# Patient Record
Sex: FEMALE | Race: WHITE | NOT HISPANIC OR LATINO | Employment: OTHER | URBAN - METROPOLITAN AREA
[De-identification: names, ages, dates, MRNs, and addresses within clinical notes are randomized per-mention and may not be internally consistent; named-entity substitution may affect disease eponyms.]

---

## 2023-12-19 ENCOUNTER — TRANSFERRED RECORDS (OUTPATIENT)
Dept: HEALTH INFORMATION MANAGEMENT | Facility: CLINIC | Age: 68
End: 2023-12-19

## 2023-12-28 ENCOUNTER — TRANSCRIBE ORDERS (OUTPATIENT)
Dept: OTHER | Age: 68
End: 2023-12-28

## 2023-12-28 DIAGNOSIS — E21.3 HYPERPARATHYROIDISM (H): Primary | ICD-10-CM

## 2023-12-29 ENCOUNTER — TELEPHONE (OUTPATIENT)
Dept: ENDOCRINOLOGY | Facility: CLINIC | Age: 68
End: 2023-12-29
Payer: MEDICARE

## 2023-12-29 NOTE — TELEPHONE ENCOUNTER
[facility] Call [facility]    Phone Message    May a detailed message be left on voicemail: yes     Reason for Call: Appointment Intake    Referring Provider Name: LUKE MORRIS  Diagnosis and/or Symptoms: Hyperparathyroidism- scheduled first avail 8/12/24- this dx to schedule within 2 weeks, please review, thank you    Action Taken: Message routed to:  [facility] ([facility] endocrinology    Travel Screening: Not Applicable

## 2023-12-29 NOTE — TELEPHONE ENCOUNTER
Endocrine triage  The scheduled first available endocrine appointment timeframe is acceptable until/unless someone can provide some relevant data.  Chart reviews no relevant data. .  E-consult may be an option for answer to specific question by the referring provider.  E-consults generally have a response within 3 days.  Beverly Kong MD

## 2023-12-29 NOTE — TELEPHONE ENCOUNTER
No results available to triage for a sooner visit. However note in referral states:   Note:    Additional Information:  Dr. Santana - secondary hyperparathyroidism/browns tumor for possible parathyroidectomy    Pended for triage.   Stephanie Bell RN on 12/29/2023 at 10:23 AM

## 2024-01-03 NOTE — TELEPHONE ENCOUNTER
Called RN at [facility] in [place], [place]- spoke with Salud.    She gave me some additional facts about the pt.  In the past, she was told parathyroid removal likely was needed, but pt didn't want to do it, wanted to try medication first for Giant cell leision / Hyperparathyroidism    She has a long standing PTH elevation.  Recently had a fall and is currently on dialysis.     Pt now in agreement that she needs a parathyroidectomy.    Medication was being used.  Cannot use due to lab changes currently.    Offered pt appt here in [place] next Thursday 1/11 at 10:10am, but pt unable to make appt as she is at dialysis Tuesday/Thursday.  She states Mondays work the best for her.    Routing message to Dr. Santana's team in [place].    Pati Reid RN

## 2024-01-04 NOTE — TELEPHONE ENCOUNTER
FUTURE VISIT INFORMATION      FUTURE VISIT INFORMATION:  Date: 3/4/24  Time: 4pm  Location: [facility]  REFERRAL INFORMATION:  Referring provider:  Dr.Kyle Simpson  Referring providers clinic:  [facility] [place] [facility]   Reason for visit/diagnosis  Hyperparathyroidism - [facility] [place] [facility]     RECORDS REQUESTED FROM:       Clinic name Comments Records Status Imaging Status   [facility]  [facility]- Procedure Dr.Kyle Simpson CE    [facility] 12/29/23- mychart referral Epic     [facility]  10/25/23- PET  CE 2/27/24- pending  PACS      February 27, 2024 10:46 AM Called [facility] to push images to [facility] PACS- Cindi   February 27, 2024 11:12 AM - Received image in pacs and attached it to patient- Cindi

## 2024-01-05 NOTE — TELEPHONE ENCOUNTER
Reviewed chart.  Pt is scheduled to see Dr. Santana at the  on 3/4/24.    Closing encounter.  Pati Reid RN

## 2024-03-04 ENCOUNTER — OFFICE VISIT (OUTPATIENT)
Dept: OTOLARYNGOLOGY | Facility: CLINIC | Age: 69
End: 2024-03-04
Payer: MEDICARE

## 2024-03-04 ENCOUNTER — PREP FOR PROCEDURE (OUTPATIENT)
Dept: OTOLARYNGOLOGY | Facility: CLINIC | Age: 69
End: 2024-03-04

## 2024-03-04 ENCOUNTER — PRE VISIT (OUTPATIENT)
Dept: OTOLARYNGOLOGY | Facility: CLINIC | Age: 69
End: 2024-03-04

## 2024-03-04 VITALS
SYSTOLIC BLOOD PRESSURE: 167 MMHG | DIASTOLIC BLOOD PRESSURE: 80 MMHG | HEIGHT: 64 IN | BODY MASS INDEX: 24.07 KG/M2 | TEMPERATURE: 97.2 F | HEART RATE: 66 BPM | WEIGHT: 141 LBS

## 2024-03-04 DIAGNOSIS — N25.81 SECONDARY RENAL HYPERPARATHYROIDISM (H): Primary | ICD-10-CM

## 2024-03-04 DIAGNOSIS — E21.0: ICD-10-CM

## 2024-03-04 PROCEDURE — 99204 OFFICE O/P NEW MOD 45 MIN: CPT | Performed by: SURGERY

## 2024-03-04 RX ORDER — SODIUM ZIRCONIUM CYCLOSILICATE 10 G/10G
POWDER, FOR SUSPENSION ORAL
Status: ON HOLD | COMMUNITY
Start: 2023-08-28 | End: 2024-06-05

## 2024-03-04 RX ORDER — HYDROXYZINE HYDROCHLORIDE 10 MG/1
10-20 TABLET, FILM COATED ORAL EVERY 8 HOURS PRN
COMMUNITY
Start: 2023-11-14

## 2024-03-04 RX ORDER — FLUTICASONE PROPIONATE 50 MCG
2 SPRAY, SUSPENSION (ML) NASAL PRN
COMMUNITY
Start: 2023-02-13

## 2024-03-04 RX ORDER — SEVELAMER CARBONATE 800 MG/1
TABLET, FILM COATED ORAL
COMMUNITY

## 2024-03-04 RX ORDER — DEXAMETHASONE SODIUM PHOSPHATE 4 MG/ML
10 INJECTION, SOLUTION INTRA-ARTICULAR; INTRALESIONAL; INTRAMUSCULAR; INTRAVENOUS; SOFT TISSUE ONCE
Status: CANCELLED | OUTPATIENT
Start: 2024-03-04 | End: 2024-03-04

## 2024-03-04 RX ORDER — CARVEDILOL 12.5 MG/1
12.5 TABLET ORAL 2 TIMES DAILY WITH MEALS
COMMUNITY
Start: 2024-01-22

## 2024-03-04 RX ORDER — AMLODIPINE BESYLATE 10 MG/1
10 TABLET ORAL DAILY
COMMUNITY
Start: 2024-02-07

## 2024-03-04 RX ORDER — NITROGLYCERIN 0.4 MG/1
TABLET SUBLINGUAL
COMMUNITY

## 2024-03-04 RX ORDER — ONDANSETRON 4 MG/1
TABLET, FILM COATED ORAL
COMMUNITY
Start: 2024-01-02

## 2024-03-04 RX ORDER — ASPIRIN 81 MG/1
81 TABLET ORAL DAILY
COMMUNITY

## 2024-03-04 RX ORDER — ENALAPRIL MALEATE 10 MG/1
TABLET ORAL
COMMUNITY
Start: 2023-05-11

## 2024-03-04 RX ORDER — DULOXETIN HYDROCHLORIDE 20 MG/1
20 CAPSULE, DELAYED RELEASE ORAL EVERY EVENING
COMMUNITY
Start: 2024-02-08

## 2024-03-04 ASSESSMENT — PAIN SCALES - GENERAL: PAINLEVEL: MODERATE PAIN (5)

## 2024-03-04 NOTE — LETTER
3/4/2024       RE: Radha Winchester  [address]   [place] [address]     Dear Colleague,    Thank you for referring your patient, Radha Winchester, to the [facility] [place] [facility]. Please see a copy of my visit note below.    Teaching Flowsheet - ENT   Relevant Diagnosis: hyperparathyroidism   Teaching Topic:Person(s) involved in teaching: patient and son        Motivation Level:  Asks Questions:   Yes  Eager to Learn:   Yes  Cooperative:   Yes  Receptive (willing/able to accept information):   Yes  Comments: Reviewed pre-op H and P,  NPO prior to  surgery,  pre-op scrub (given Hibiclens)  Reviewed post-op  cares , activity and pain.     Patient demonstrates understanding of the following:  Reason for the appointment, diagnosis and treatment plan:   Yes  Knowledge of proper use of medications and conditions for which they are ordered (with special attention to potential side effects or drug interactions):  stop aspirin products 1 week before surgery Yes  Which situations necessitate calling provider and whom to contact:   Yes  Nutritional needs and diet plan:   Yes  Pain management techniques:   Yes  Patient instructed on hand hygiene:  Yes  How and/when to access community resources:   Yes     Infection Prevention:  Patient   demonstrates understanding of the following:  Surgical procedure site care taught Yes  Signs and symptoms of infection taught Yes  Wound care taught Yes  Instructional Materials Used/Given: verbal instruction.     SURGERY CLINIC CONSULTATION    REASON FOR CONSULTATION:  Radha Winchester was referred by Dr. Simpson in nephrology for evaluation and discussion of treatment options for secondary hyperparathyroidism with brown bone tumors     HISTORY OF PRESENT ILLNESS:  Radha Winchester is a 68 year old female who with a pertinent history of ovarian cancer treated with a TRACY/BSO many many years ago.  She  "also has renal failure on hemodialysis for several years.  She fell recently and an x-ray identified several lytic lesions.  With her ovarian cancer history as well as her renal failure a PET scan was obtained that identified multiple lytic lesions throughout her skeleton.  She underwent a biopsy of the left iliac bone that was consistent with a brown bone tumor.  Her  was within normal limits.  Her most recent labs include a calcium of 9.4, and alk phos of 593.  I do not see a parathyroid hormone level.  And I confirmed with her that she is not on a calcimimetic.  I do see in her chart if there is a recent or relatively recent history of a non-STEMI.    Regarding these lytic lesions and her hyperparathyroidism, I confirmed that she is not and has not been on a calcium by medic recently.  She has had fractures and has bone pain throughout her extremities.  In fact she finds it difficult to walk.  She has no previous neck surgeries.  No previous head neck radiation.    The patient has a significant medical and surgical history which I reviewed.  It is available in the [employer] system and through Care Everywhere in [facility]      REVIEW OF SYSTEMS:  ROS EXAM: 10 point review of systems is pertinent for that noted in the HPI  There is no problem list on file for this patient.      No past surgical history on file.    Allergies   Allergen Reactions     Lidocaine Dermatitis and Rash     Cream only; Applied topically to fistula; \"it was eating at my arm\"     Clopidogrel Other (See Comments)     NON-responder     Levofloxacin Diarrhea and Unknown     Metformin Diarrhea     Lorazepam Other (See Comments)     Povidone Iodine Itching and Rash       Medications reviewed in EMR        No family history on file.       PHYSICAL EXAM:  BP (!) 167/80   Pulse 66   Temp 97.2  F (36.2  C)   Ht 1.626 m (5' 4\")   Wt 64 kg (141 lb)   BMI 24.20 kg/m      Neck: Her thyroid gland is very small.  She does have prominent veins in the " anterior jugular index internal jugular region.  There is no cervical lymphadenopathy.  Trachea is midline.    I personally reviewed her outside radiographic images and laboratory data    ASSESSMENT:   1. Secondary renal hyperparathyroidism (H24)    2. 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  So a PACS order was placed  Following this I like to get an ultrasound of her neck to help us localize these parathyroid adenomas  I then recommend the patient undergo a 3-1/2 gland parathyroidectomy.  The surgical procedure was discussed with the patient including likely severe bone hunger and the need for a hospitalization, we reviewed the risks of bleeding infection injury to the recurrent laryngeal nerve or nerves potential permanent hypocalcemia.  At the conclusion of our visit the patient felt that all of her questions and concerns were addressed regarding her hyperparathyroidism    Anita Santana MD

## 2024-03-04 NOTE — PROGRESS NOTES
Teaching Flowsheet - ENT   Relevant Diagnosis: hyperparathyroidism   Teaching Topic:Person(s) involved in teaching: patient and son        Motivation Level:  Asks Questions:   Yes  Eager to Learn:   Yes  Cooperative:   Yes  Receptive (willing/able to accept information):   Yes  Comments: Reviewed pre-op H and P,  NPO prior to  surgery,  pre-op scrub (given Hibiclens)  Reviewed post-op  cares , activity and pain.     Patient demonstrates understanding of the following:  Reason for the appointment, diagnosis and treatment plan:   Yes  Knowledge of proper use of medications and conditions for which they are ordered (with special attention to potential side effects or drug interactions):  stop aspirin products 1 week before surgery Yes  Which situations necessitate calling provider and whom to contact:   Yes  Nutritional needs and diet plan:   Yes  Pain management techniques:   Yes  Patient instructed on hand hygiene:  Yes  How and/when to access community resources:   Yes     Infection Prevention:  Patient   demonstrates understanding of the following:  Surgical procedure site care taught Yes  Signs and symptoms of infection taught Yes  Wound care taught Yes  Instructional Materials Used/Given: verbal instruction.

## 2024-03-05 ENCOUNTER — TELEPHONE (OUTPATIENT)
Dept: SURGERY | Facility: CLINIC | Age: 69
End: 2024-03-05
Payer: MEDICARE

## 2024-03-05 NOTE — TELEPHONE ENCOUNTER
Called patient to schedule 3 AND 1/2 GLAND PARATHYROIDECTOMY (N/A) with Dr. Santana. Patient was at another appointment at the time of the call and therefore asked that writer followed back up today around 11:30.    Caroline Hanley on 3/5/2024 at 9:48 AM\

## 2024-03-05 NOTE — TELEPHONE ENCOUNTER
Patient is scheduled for surgery with Dr. Santana.     Spoke with: Patient     Date of Surgery: 5/01/24, offered patient 3/20/24    Location: [facility] OR     Pre op with Provider: ELLA     H&P: Patient will schedule pre op with Brigitte Browne. Informed patient pre op will need to be done within 30 days of surgery date.     Additional imaging/appointments: Per patients preference, scheduled for a virtual visit with Dr. Santana on 5/13/24 at 1:20 PM.   Patient needs an US prior to surgery, patient has been made aware of this. Patient asked I confirm if ok for imaging to be scheduled at clinic closer to home in [place]. Please advise.     Surgery packet: Will mail packet, confirmed with patient address in chart works best. Patient made aware arrival time for surgery will not be listed within packet.      Additional comments: Patient made aware a pre op nurse will call 2-5 days prior to surgery to go over further details/give arrival time.         Caroline Hanley on 3/5/2024 at 11:36 AM

## 2024-03-06 NOTE — TELEPHONE ENCOUNTER
Called patient back and informed them that it is ok for her to schedule US prior to surgery at any [place] location. Patient understood and thanked writer.       Caroline Hanley on 3/6/2024 at 8:12 AM

## 2024-03-10 ENCOUNTER — HEALTH MAINTENANCE LETTER (OUTPATIENT)
Age: 69
End: 2024-03-10

## 2024-03-11 ENCOUNTER — HOSPITAL ENCOUNTER (OUTPATIENT)
Dept: ULTRASOUND IMAGING | Facility: HOSPITAL | Age: 69
Discharge: HOME | End: 2024-03-11
Attending: SURGERY | Admitting: SURGERY
Payer: MEDICARE

## 2024-03-11 DIAGNOSIS — N25.81 SECONDARY RENAL HYPERPARATHYROIDISM (H): ICD-10-CM

## 2024-03-11 DIAGNOSIS — E21.0: ICD-10-CM

## 2024-03-11 PROCEDURE — 76536 US EXAM OF HEAD AND NECK: CPT

## 2024-03-22 NOTE — TELEPHONE ENCOUNTER
Rescheduled surgery with Dr. Santana from 5/01/2024 to 5/08/2024    Spoke with: Patient     Reason for reschedule: Dr. Santana unavailable on 5/01/24 for surgery.     Surgery is located at [facility], [place] [address]    Patient is aware their H&P will need to be within 30 days of their new surgery date     Is there imaging that needs to be rescheduled for new surgery date? No    Patients 2 week virtual post op has been rescheduled to 5/20/24 at 1:40PM.     Additional comments: Will send surgery packet through [email or web address] to reflect new surgery date.      Patient will call back if they have any questions or concerns.    Caroline Hanley on 3/22/2024 at 2:44 PM

## 2024-03-28 NOTE — TELEPHONE ENCOUNTER
Patient left a  asking for a call back regarding rescheduling surgery with Dr. Santana.     Rescheduled surgery with Dr. Santana from 5/08/2024 to 6/05/2024. Offered patient 5/15/24 however she said that date did not work and preferred to be scheduled into June.     Spoke with: Patient     Reason for reschedule: Dr. Santana no longer available on 5/08/2024.     [place] [address]    Patient is aware their H&P will need to be within 30 days of their new surgery date     Is there imaging that needs to be rescheduled for new surgery date? No    Patients 2  week post op has been rescheduled to 6/13/2024 at 11:30 AM.     Additional comments: Will send another surgery packet through [employer] to reflect new surgery date.     Patient will call back if they have any questions or concerns.    Caroline Hanley on 3/28/2024 at 1:16 PM

## 2024-03-28 NOTE — TELEPHONE ENCOUNTER
Called patient to reschedule surgery with Dr. Santana on 5/08/24 as Dr. Santana is no longer available. Writer offered patient reschedule date of 5/15/24. Patient said she would like to confirm a few things with her daughter and will give our office a call back. Patient provided with callback number [phone]

## 2024-04-13 NOTE — PROGRESS NOTES
"SURGERY CLINIC CONSULTATION    REASON FOR CONSULTATION:  Radha Winchester was referred by Dr. Simpson in nephrology for evaluation and discussion of treatment options for secondary hyperparathyroidism with brown bone tumors     HISTORY OF PRESENT ILLNESS:  Radha Winchester is a 68 year old female who with a pertinent history of ovarian cancer treated with a TRACY/BSO many many years ago.  She also has renal failure on hemodialysis for several years.  She fell recently and an x-ray identified several lytic lesions.  With her ovarian cancer history as well as her renal failure a PET scan was obtained that identified multiple lytic lesions throughout her skeleton.  She underwent a biopsy of the left iliac bone that was consistent with a brown bone tumor.  Her  was within normal limits.  Her most recent labs include a calcium of 9.4, and alk phos of 593.  I do not see a parathyroid hormone level.  And I confirmed with her that she is not on a calcimimetic.  I do see in her chart if there is a recent or relatively recent history of a non-STEMI.    Regarding these lytic lesions and her hyperparathyroidism, I confirmed that she is not and has not been on a calcium by medic recently.  She has had fractures and has bone pain throughout her extremities.  In fact she finds it difficult to walk.  She has no previous neck surgeries.  No previous head neck radiation.    The patient has a significant medical and surgical history which I reviewed.  It is available in the [employer] system and through Care Everywhere in [facility]      REVIEW OF SYSTEMS:  ROS EXAM: 10 point review of systems is pertinent for that noted in the HPI  There is no problem list on file for this patient.      No past surgical history on file.    Allergies   Allergen Reactions    Lidocaine Dermatitis and Rash     Cream only; Applied topically to fistula; \"it was eating at my arm\"    Clopidogrel Other (See Comments)     NON-responder    Levofloxacin Diarrhea and Unknown " "   Metformin Diarrhea    Lorazepam Other (See Comments)    Povidone Iodine Itching and Rash       Medications reviewed in EMR        No family history on file.       PHYSICAL EXAM:  BP (!) 167/80   Pulse 66   Temp 97.2  F (36.2  C)   Ht 1.626 m (5' 4\")   Wt 64 kg (141 lb)   BMI 24.20 kg/m      Neck: Her thyroid gland is very small.  She does have prominent veins in the anterior jugular index internal jugular region.  There is no cervical lymphadenopathy.  Trachea is midline.    I personally reviewed her outside radiographic images and laboratory data    ASSESSMENT:   1. Secondary renal hyperparathyroidism (H24)    2. 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  So a PACS order was placed  Following this I like to get an ultrasound of her neck to help us localize these parathyroid adenomas  I then recommend the patient undergo a 3-1/2 gland parathyroidectomy.  The surgical procedure was discussed with the patient including likely severe bone hunger and the need for a hospitalization, we reviewed the risks of bleeding infection injury to the recurrent laryngeal nerve or nerves potential permanent hypocalcemia.  At the conclusion of our visit the patient felt that all of her questions and concerns were addressed regarding her hyperparathyroidism    Anita Santana MD                "

## 2024-04-24 NOTE — TELEPHONE ENCOUNTER
Called patient to reschedule surgery on 6/05 with Dr. Santana. Patient offered reschedule date of 5/21/24. Patient said she would like to speak with her daughter to confirm that date works and will give our office a call back. Patient provided with callback number [phone].     Caroline Hanley on 4/24/2024 at 10:05 AM

## 2024-04-24 NOTE — TELEPHONE ENCOUNTER
Rescheduled surgery with Dr. Santana from 6/05/24 to 6/04/24.    Spoke with: Patient     Reason for reschedule: Dr. Santana no longer available on 6/05/24.    Surgery is located at [facility], [place] [address]    Patient is aware their H&P will need to be within 30 days of their new surgery date     Is there imaging that needs to be rescheduled for new surgery date? No: US completed 3/21/24.      Additional comments: Will send another surgery packet through [employer] to reflect new surgery date.      Patient will call back if they have any questions or concerns.    Caroline Hanley on 4/24/2024 at 10:26 AM

## 2024-05-28 ENCOUNTER — TELEPHONE (OUTPATIENT)
Dept: SURGERY | Facility: CLINIC | Age: 69
End: 2024-05-28
Payer: MEDICARE

## 2024-05-28 NOTE — TELEPHONE ENCOUNTER
Called pt to let her know Dr. Santana wants her to send her dialysis records to nephrology.  Pt said she is supposed to have dialysis after surgery.  She wants to ensure this will be set up after surgery since she is supposed to stay in the hospital overnight.    Pt would like to know when she should check in for surgery.  I let her know preop nurses will call her 1-3 days prior to surgery with an arrival time.      Routing message to [facility] team.    Pati Reid RN

## 2024-05-28 NOTE — TELEPHONE ENCOUNTER
[facility] Call Center    Phone Message    May a detailed message be left on voicemail: yes     Reason for Call: Other: Per pt she is wondering where she should have her dialyses information sent to. Please call to discuss. Thank you     Action Taken: Message routed to:  [facility] ENT    Travel Screening: Not Applicable

## 2024-05-28 NOTE — TELEPHONE ENCOUNTER
Called patient to review questions. Patient discussed that she is on a Tuesday, Thursday, Saturday scheduled for dialysis. Reviewed that patient will receive dialysis after surgery. Patient verbalized understanding and will follow up with surgery as scheduled.    Daisy MCLEODN, RN

## 2024-06-03 ENCOUNTER — ANESTHESIA EVENT (OUTPATIENT)
Dept: SURGERY | Facility: CLINIC | Age: 69
DRG: 674 | End: 2024-06-03
Payer: MEDICARE

## 2024-06-04 ENCOUNTER — ANESTHESIA (OUTPATIENT)
Dept: SURGERY | Facility: CLINIC | Age: 69
DRG: 674 | End: 2024-06-04
Payer: MEDICARE

## 2024-06-04 ENCOUNTER — HOSPITAL ENCOUNTER (INPATIENT)
Facility: CLINIC | Age: 69
LOS: 5 days | Discharge: HOME | DRG: 674 | End: 2024-06-09
Attending: SURGERY | Admitting: SURGERY
Payer: MEDICARE

## 2024-06-04 DIAGNOSIS — E21.3 HYPERPARATHYROIDISM (H): ICD-10-CM

## 2024-06-04 DIAGNOSIS — E83.81 HUNGRY BONE SYNDROME: Primary | ICD-10-CM

## 2024-06-04 PROBLEM — Z90.89 S/P SUBTOTAL PARATHYROIDECTOMY: Status: ACTIVE | Noted: 2024-06-04

## 2024-06-04 PROBLEM — Z98.890 S/P SUBTOTAL PARATHYROIDECTOMY: Status: ACTIVE | Noted: 2024-06-04

## 2024-06-04 LAB
ALBUMIN SERPL BCG-MCNC: 4.2 G/DL (ref 3.5–5.2)
CALCIUM SERPL-MCNC: 7.3 MG/DL (ref 8.8–10.2)
GLUCOSE BLDC GLUCOMTR-MCNC: 110 MG/DL (ref 70–99)
GLUCOSE BLDC GLUCOMTR-MCNC: 119 MG/DL (ref 70–99)
MAGNESIUM SERPL-MCNC: 2.4 MG/DL (ref 1.7–2.3)
PHOSPHATE SERPL-MCNC: 6.6 MG/DL (ref 2.5–4.5)
POTASSIUM SERPL-SCNC: 5.4 MMOL/L (ref 3.4–5.3)
POTASSIUM SERPL-SCNC: 6.2 MMOL/L (ref 3.4–5.3)
PTH-INTACT SERPL-MCNC: 145 PG/ML (ref 15–65)
PTH-INTACT SERPL-MCNC: 1990 PG/ML (ref 15–65)

## 2024-06-04 PROCEDURE — 60500 EXPLORE PARATHYROID GLANDS: CPT | Mod: GC | Performed by: STUDENT IN AN ORGANIZED HEALTH CARE EDUCATION/TRAINING PROGRAM

## 2024-06-04 PROCEDURE — 710N000010 HC RECOVERY PHASE 1, LEVEL 2, PER MIN: Performed by: SURGERY

## 2024-06-04 PROCEDURE — 83970 ASSAY OF PARATHORMONE: CPT | Performed by: STUDENT IN AN ORGANIZED HEALTH CARE EDUCATION/TRAINING PROGRAM

## 2024-06-04 PROCEDURE — 82310 ASSAY OF CALCIUM: CPT | Performed by: STUDENT IN AN ORGANIZED HEALTH CARE EDUCATION/TRAINING PROGRAM

## 2024-06-04 PROCEDURE — 0GTL0ZZ RESECTION OF RIGHT SUPERIOR PARATHYROID GLAND, OPEN APPROACH: ICD-10-PCS | Performed by: SURGERY

## 2024-06-04 PROCEDURE — 250N000011 HC RX IP 250 OP 636

## 2024-06-04 PROCEDURE — 250N000011 HC RX IP 250 OP 636: Performed by: SURGERY

## 2024-06-04 PROCEDURE — 84100 ASSAY OF PHOSPHORUS: CPT | Performed by: STUDENT IN AN ORGANIZED HEALTH CARE EDUCATION/TRAINING PROGRAM

## 2024-06-04 PROCEDURE — 250N000025 HC SEVOFLURANE, PER MIN: Performed by: SURGERY

## 2024-06-04 PROCEDURE — 88305 TISSUE EXAM BY PATHOLOGIST: CPT | Mod: 26 | Performed by: STUDENT IN AN ORGANIZED HEALTH CARE EDUCATION/TRAINING PROGRAM

## 2024-06-04 PROCEDURE — 0GTM0ZZ RESECTION OF LEFT SUPERIOR PARATHYROID GLAND, OPEN APPROACH: ICD-10-PCS | Performed by: SURGERY

## 2024-06-04 PROCEDURE — 83735 ASSAY OF MAGNESIUM: CPT | Performed by: STUDENT IN AN ORGANIZED HEALTH CARE EDUCATION/TRAINING PROGRAM

## 2024-06-04 PROCEDURE — 82962 GLUCOSE BLOOD TEST: CPT

## 2024-06-04 PROCEDURE — 88331 PATH CONSLTJ SURG 1 BLK 1SPC: CPT | Mod: 26 | Performed by: STUDENT IN AN ORGANIZED HEALTH CARE EDUCATION/TRAINING PROGRAM

## 2024-06-04 PROCEDURE — 360N000077 HC SURGERY LEVEL 4, PER MIN: Performed by: SURGERY

## 2024-06-04 PROCEDURE — 60500 EXPLORE PARATHYROID GLANDS: CPT

## 2024-06-04 PROCEDURE — 60500 EXPLORE PARATHYROID GLANDS: CPT | Performed by: ANESTHESIOLOGY

## 2024-06-04 PROCEDURE — 88331 PATH CONSLTJ SURG 1 BLK 1SPC: CPT | Mod: TC | Performed by: SURGERY

## 2024-06-04 PROCEDURE — 250N000009 HC RX 250

## 2024-06-04 PROCEDURE — 258N000003 HC RX IP 258 OP 636

## 2024-06-04 PROCEDURE — 99222 1ST HOSP IP/OBS MODERATE 55: CPT | Mod: 25 | Performed by: INTERNAL MEDICINE

## 2024-06-04 PROCEDURE — 250N000013 HC RX MED GY IP 250 OP 250 PS 637: Performed by: STUDENT IN AN ORGANIZED HEALTH CARE EDUCATION/TRAINING PROGRAM

## 2024-06-04 PROCEDURE — 120N000002 HC R&B MED SURG/OB UMMC

## 2024-06-04 PROCEDURE — 84132 ASSAY OF SERUM POTASSIUM: CPT

## 2024-06-04 PROCEDURE — 999N000141 HC STATISTIC PRE-PROCEDURE NURSING ASSESSMENT: Performed by: SURGERY

## 2024-06-04 PROCEDURE — 82040 ASSAY OF SERUM ALBUMIN: CPT | Performed by: STUDENT IN AN ORGANIZED HEALTH CARE EDUCATION/TRAINING PROGRAM

## 2024-06-04 PROCEDURE — 0GBN0ZX EXCISION OF RIGHT INFERIOR PARATHYROID GLAND, OPEN APPROACH, DIAGNOSTIC: ICD-10-PCS | Performed by: SURGERY

## 2024-06-04 PROCEDURE — 370N000017 HC ANESTHESIA TECHNICAL FEE, PER MIN: Performed by: SURGERY

## 2024-06-04 PROCEDURE — 83970 ASSAY OF PARATHORMONE: CPT | Performed by: SURGERY

## 2024-06-04 PROCEDURE — 250N000013 HC RX MED GY IP 250 OP 250 PS 637: Performed by: SURGERY

## 2024-06-04 PROCEDURE — 36415 COLL VENOUS BLD VENIPUNCTURE: CPT

## 2024-06-04 PROCEDURE — 0GTP0ZZ RESECTION OF LEFT INFERIOR PARATHYROID GLAND, OPEN APPROACH: ICD-10-PCS | Performed by: SURGERY

## 2024-06-04 PROCEDURE — 272N000001 HC OR GENERAL SUPPLY STERILE: Performed by: SURGERY

## 2024-06-04 PROCEDURE — 36415 COLL VENOUS BLD VENIPUNCTURE: CPT | Performed by: STUDENT IN AN ORGANIZED HEALTH CARE EDUCATION/TRAINING PROGRAM

## 2024-06-04 RX ORDER — CARVEDILOL 12.5 MG/1
12.5 TABLET ORAL 2 TIMES DAILY WITH MEALS
Status: DISCONTINUED | OUTPATIENT
Start: 2024-06-05 | End: 2024-06-09 | Stop reason: HOSPADM

## 2024-06-04 RX ORDER — NALOXONE HYDROCHLORIDE 0.4 MG/ML
0.1 INJECTION, SOLUTION INTRAMUSCULAR; INTRAVENOUS; SUBCUTANEOUS
Status: DISCONTINUED | OUTPATIENT
Start: 2024-06-04 | End: 2024-06-04 | Stop reason: HOSPADM

## 2024-06-04 RX ORDER — CALCIUM CARBONATE 500 MG/1
1000 TABLET, CHEWABLE ORAL 4 TIMES DAILY
Status: DISCONTINUED | OUTPATIENT
Start: 2024-06-04 | End: 2024-06-04

## 2024-06-04 RX ORDER — CALCITRIOL 0.5 UG/1
0.5 CAPSULE, LIQUID FILLED ORAL DAILY
Status: DISCONTINUED | OUTPATIENT
Start: 2024-06-04 | End: 2024-06-06

## 2024-06-04 RX ORDER — NALOXONE HYDROCHLORIDE 0.4 MG/ML
0.4 INJECTION, SOLUTION INTRAMUSCULAR; INTRAVENOUS; SUBCUTANEOUS
Status: DISCONTINUED | OUTPATIENT
Start: 2024-06-04 | End: 2024-06-09 | Stop reason: HOSPADM

## 2024-06-04 RX ORDER — FENTANYL CITRATE 50 UG/ML
25 INJECTION, SOLUTION INTRAMUSCULAR; INTRAVENOUS EVERY 5 MIN PRN
Status: DISCONTINUED | OUTPATIENT
Start: 2024-06-04 | End: 2024-06-04 | Stop reason: HOSPADM

## 2024-06-04 RX ORDER — ONDANSETRON 4 MG/1
4 TABLET, ORALLY DISINTEGRATING ORAL EVERY 30 MIN PRN
Status: DISCONTINUED | OUTPATIENT
Start: 2024-06-04 | End: 2024-06-04 | Stop reason: HOSPADM

## 2024-06-04 RX ORDER — DULOXETIN HYDROCHLORIDE 20 MG/1
20 CAPSULE, DELAYED RELEASE ORAL DAILY
Status: DISCONTINUED | OUTPATIENT
Start: 2024-06-05 | End: 2024-06-05

## 2024-06-04 RX ORDER — BISACODYL 10 MG
10 SUPPOSITORY, RECTAL RECTAL DAILY PRN
Status: DISCONTINUED | OUTPATIENT
Start: 2024-06-07 | End: 2024-06-09 | Stop reason: HOSPADM

## 2024-06-04 RX ORDER — ONDANSETRON 2 MG/ML
4 INJECTION INTRAMUSCULAR; INTRAVENOUS EVERY 30 MIN PRN
Status: DISCONTINUED | OUTPATIENT
Start: 2024-06-04 | End: 2024-06-04 | Stop reason: HOSPADM

## 2024-06-04 RX ORDER — DEXAMETHASONE SODIUM PHOSPHATE 4 MG/ML
10 INJECTION, SOLUTION INTRA-ARTICULAR; INTRALESIONAL; INTRAMUSCULAR; INTRAVENOUS; SOFT TISSUE ONCE
Status: COMPLETED | OUTPATIENT
Start: 2024-06-04 | End: 2024-06-04

## 2024-06-04 RX ORDER — ACETAMINOPHEN 325 MG/1
650 TABLET ORAL EVERY 4 HOURS PRN
Status: DISCONTINUED | OUTPATIENT
Start: 2024-06-07 | End: 2024-06-09 | Stop reason: HOSPADM

## 2024-06-04 RX ORDER — FENTANYL CITRATE 50 UG/ML
50 INJECTION, SOLUTION INTRAMUSCULAR; INTRAVENOUS EVERY 5 MIN PRN
Status: DISCONTINUED | OUTPATIENT
Start: 2024-06-04 | End: 2024-06-04 | Stop reason: HOSPADM

## 2024-06-04 RX ORDER — AMLODIPINE BESYLATE 10 MG/1
10 TABLET ORAL DAILY
Status: DISCONTINUED | OUTPATIENT
Start: 2024-06-05 | End: 2024-06-09 | Stop reason: HOSPADM

## 2024-06-04 RX ORDER — HYDRALAZINE HYDROCHLORIDE 20 MG/ML
2.5-5 INJECTION INTRAMUSCULAR; INTRAVENOUS EVERY 10 MIN PRN
Status: DISCONTINUED | OUTPATIENT
Start: 2024-06-04 | End: 2024-06-04 | Stop reason: HOSPADM

## 2024-06-04 RX ORDER — ACETAMINOPHEN 325 MG/1
975 TABLET ORAL EVERY 8 HOURS
Status: COMPLETED | OUTPATIENT
Start: 2024-06-04 | End: 2024-06-07

## 2024-06-04 RX ORDER — OXYCODONE HYDROCHLORIDE 5 MG/1
5 TABLET ORAL EVERY 4 HOURS PRN
Status: DISCONTINUED | OUTPATIENT
Start: 2024-06-04 | End: 2024-06-05

## 2024-06-04 RX ORDER — HYDROMORPHONE HCL IN WATER/PF 6 MG/30 ML
0.4 PATIENT CONTROLLED ANALGESIA SYRINGE INTRAVENOUS EVERY 5 MIN PRN
Status: DISCONTINUED | OUTPATIENT
Start: 2024-06-04 | End: 2024-06-04 | Stop reason: HOSPADM

## 2024-06-04 RX ORDER — NALOXONE HYDROCHLORIDE 0.4 MG/ML
0.2 INJECTION, SOLUTION INTRAMUSCULAR; INTRAVENOUS; SUBCUTANEOUS
Status: DISCONTINUED | OUTPATIENT
Start: 2024-06-04 | End: 2024-06-09 | Stop reason: HOSPADM

## 2024-06-04 RX ORDER — CALCIUM GLUCONATE 20 MG/ML
2 INJECTION, SOLUTION INTRAVENOUS ONCE
Status: COMPLETED | OUTPATIENT
Start: 2024-06-05 | End: 2024-06-05

## 2024-06-04 RX ORDER — AMOXICILLIN 250 MG
1 CAPSULE ORAL 2 TIMES DAILY
Status: DISCONTINUED | OUTPATIENT
Start: 2024-06-04 | End: 2024-06-09 | Stop reason: HOSPADM

## 2024-06-04 RX ORDER — POLYETHYLENE GLYCOL 3350 17 G/17G
17 POWDER, FOR SOLUTION ORAL DAILY
Status: DISCONTINUED | OUTPATIENT
Start: 2024-06-05 | End: 2024-06-09 | Stop reason: HOSPADM

## 2024-06-04 RX ORDER — NICOTINE POLACRILEX 4 MG
15-30 LOZENGE BUCCAL
Status: DISCONTINUED | OUTPATIENT
Start: 2024-06-04 | End: 2024-06-09 | Stop reason: HOSPADM

## 2024-06-04 RX ORDER — CALCIUM CARBONATE 500 MG/1
1000 TABLET, CHEWABLE ORAL 4 TIMES DAILY
Status: DISCONTINUED | OUTPATIENT
Start: 2024-06-05 | End: 2024-06-06

## 2024-06-04 RX ORDER — SODIUM CHLORIDE, SODIUM LACTATE, POTASSIUM CHLORIDE, CALCIUM CHLORIDE 600; 310; 30; 20 MG/100ML; MG/100ML; MG/100ML; MG/100ML
INJECTION, SOLUTION INTRAVENOUS CONTINUOUS
Status: DISCONTINUED | OUTPATIENT
Start: 2024-06-04 | End: 2024-06-04 | Stop reason: HOSPADM

## 2024-06-04 RX ORDER — ONDANSETRON 4 MG/1
4 TABLET, ORALLY DISINTEGRATING ORAL EVERY 6 HOURS PRN
Status: DISCONTINUED | OUTPATIENT
Start: 2024-06-04 | End: 2024-06-09 | Stop reason: HOSPADM

## 2024-06-04 RX ORDER — PROCHLORPERAZINE MALEATE 5 MG
5 TABLET ORAL EVERY 6 HOURS PRN
Status: DISCONTINUED | OUTPATIENT
Start: 2024-06-04 | End: 2024-06-09 | Stop reason: HOSPADM

## 2024-06-04 RX ORDER — LABETALOL HYDROCHLORIDE 5 MG/ML
10 INJECTION, SOLUTION INTRAVENOUS
Status: DISCONTINUED | OUTPATIENT
Start: 2024-06-04 | End: 2024-06-04 | Stop reason: HOSPADM

## 2024-06-04 RX ORDER — FENTANYL CITRATE 50 UG/ML
INJECTION, SOLUTION INTRAMUSCULAR; INTRAVENOUS PRN
Status: DISCONTINUED | OUTPATIENT
Start: 2024-06-04 | End: 2024-06-04

## 2024-06-04 RX ORDER — DEXTROSE MONOHYDRATE 25 G/50ML
25 INJECTION, SOLUTION INTRAVENOUS ONCE
Status: COMPLETED | OUTPATIENT
Start: 2024-06-05 | End: 2024-06-05

## 2024-06-04 RX ORDER — HYDROMORPHONE HCL IN WATER/PF 6 MG/30 ML
0.2 PATIENT CONTROLLED ANALGESIA SYRINGE INTRAVENOUS EVERY 5 MIN PRN
Status: DISCONTINUED | OUTPATIENT
Start: 2024-06-04 | End: 2024-06-04 | Stop reason: HOSPADM

## 2024-06-04 RX ORDER — SEVELAMER CARBONATE 800 MG/1
4000 TABLET, FILM COATED ORAL
Status: DISCONTINUED | OUTPATIENT
Start: 2024-06-04 | End: 2024-06-09 | Stop reason: HOSPADM

## 2024-06-04 RX ORDER — ONDANSETRON 2 MG/ML
INJECTION INTRAMUSCULAR; INTRAVENOUS PRN
Status: DISCONTINUED | OUTPATIENT
Start: 2024-06-04 | End: 2024-06-04

## 2024-06-04 RX ORDER — DEXTROSE MONOHYDRATE 25 G/50ML
25-50 INJECTION, SOLUTION INTRAVENOUS
Status: DISCONTINUED | OUTPATIENT
Start: 2024-06-04 | End: 2024-06-09 | Stop reason: HOSPADM

## 2024-06-04 RX ORDER — ONDANSETRON 2 MG/ML
4 INJECTION INTRAMUSCULAR; INTRAVENOUS EVERY 6 HOURS PRN
Status: DISCONTINUED | OUTPATIENT
Start: 2024-06-04 | End: 2024-06-09 | Stop reason: HOSPADM

## 2024-06-04 RX ORDER — PROPOFOL 10 MG/ML
INJECTION, EMULSION INTRAVENOUS PRN
Status: DISCONTINUED | OUTPATIENT
Start: 2024-06-04 | End: 2024-06-04

## 2024-06-04 RX ORDER — SODIUM CHLORIDE, SODIUM LACTATE, POTASSIUM CHLORIDE, CALCIUM CHLORIDE 600; 310; 30; 20 MG/100ML; MG/100ML; MG/100ML; MG/100ML
INJECTION, SOLUTION INTRAVENOUS CONTINUOUS PRN
Status: DISCONTINUED | OUTPATIENT
Start: 2024-06-04 | End: 2024-06-04

## 2024-06-04 RX ADMIN — PHENYLEPHRINE HYDROCHLORIDE 100 MCG: 10 INJECTION INTRAVENOUS at 13:18

## 2024-06-04 RX ADMIN — FENTANYL CITRATE 100 MCG: 50 INJECTION INTRAMUSCULAR; INTRAVENOUS at 12:59

## 2024-06-04 RX ADMIN — HYDROMORPHONE HYDROCHLORIDE 0.5 MG: 1 INJECTION, SOLUTION INTRAMUSCULAR; INTRAVENOUS; SUBCUTANEOUS at 14:09

## 2024-06-04 RX ADMIN — REMIFENTANIL HYDROCHLORIDE 0.2 MCG/KG/MIN: 1 INJECTION, POWDER, LYOPHILIZED, FOR SOLUTION INTRAVENOUS at 12:59

## 2024-06-04 RX ADMIN — OXYCODONE HYDROCHLORIDE 5 MG: 5 TABLET ORAL at 21:18

## 2024-06-04 RX ADMIN — SODIUM CHLORIDE, POTASSIUM CHLORIDE, SODIUM LACTATE AND CALCIUM CHLORIDE: 600; 310; 30; 20 INJECTION, SOLUTION INTRAVENOUS at 12:49

## 2024-06-04 RX ADMIN — FENTANYL CITRATE 25 MCG: 50 INJECTION INTRAMUSCULAR; INTRAVENOUS at 16:43

## 2024-06-04 RX ADMIN — PHENYLEPHRINE HYDROCHLORIDE 100 MCG: 10 INJECTION INTRAVENOUS at 13:22

## 2024-06-04 RX ADMIN — DEXAMETHASONE SODIUM PHOSPHATE 10 MG: 4 INJECTION, SOLUTION INTRAMUSCULAR; INTRAVENOUS at 13:37

## 2024-06-04 RX ADMIN — REMIFENTANIL HYDROCHLORIDE 100 MCG: 1 INJECTION, POWDER, LYOPHILIZED, FOR SOLUTION INTRAVENOUS at 13:00

## 2024-06-04 RX ADMIN — CALCITRIOL 0.5 MCG: 0.5 CAPSULE ORAL at 19:39

## 2024-06-04 RX ADMIN — SEVELAMER CARBONATE 4000 MG: 800 TABLET, FILM COATED ORAL at 21:03

## 2024-06-04 RX ADMIN — PROPOFOL 90 MG: 10 INJECTION, EMULSION INTRAVENOUS at 12:59

## 2024-06-04 RX ADMIN — FENTANYL CITRATE 25 MCG: 50 INJECTION INTRAMUSCULAR; INTRAVENOUS at 15:00

## 2024-06-04 RX ADMIN — OXYCODONE HYDROCHLORIDE 5 MG: 5 TABLET ORAL at 16:50

## 2024-06-04 RX ADMIN — DOCUSATE SODIUM 50 MG AND SENNOSIDES 8.6 MG 1 TABLET: 8.6; 5 TABLET, FILM COATED ORAL at 21:05

## 2024-06-04 RX ADMIN — ONDANSETRON 4 MG: 2 INJECTION INTRAMUSCULAR; INTRAVENOUS at 14:10

## 2024-06-04 RX ADMIN — FENTANYL CITRATE 25 MCG: 50 INJECTION INTRAMUSCULAR; INTRAVENOUS at 14:42

## 2024-06-04 RX ADMIN — FENTANYL CITRATE 25 MCG: 50 INJECTION INTRAMUSCULAR; INTRAVENOUS at 15:39

## 2024-06-04 RX ADMIN — ACETAMINOPHEN 975 MG: 325 TABLET, FILM COATED ORAL at 16:49

## 2024-06-04 ASSESSMENT — ACTIVITIES OF DAILY LIVING (ADL)
ADLS_ACUITY_SCORE: 22
WALKING_OR_CLIMBING_STAIRS_DIFFICULTY: NO
ADLS_ACUITY_SCORE: 22
FALL_HISTORY_WITHIN_LAST_SIX_MONTHS: NO
ADLS_ACUITY_SCORE: 22
EQUIPMENT_CURRENTLY_USED_AT_HOME: SHOWER CHAIR;WALKER, ROLLING
ADLS_ACUITY_SCORE: 26
ADLS_ACUITY_SCORE: 22
HEARING_DIFFICULTY_OR_DEAF: NO
DRESSING/BATHING_DIFFICULTY: OTHER (SEE COMMENTS)
ADLS_ACUITY_SCORE: 22
ADLS_ACUITY_SCORE: 22
DOING_ERRANDS_INDEPENDENTLY_DIFFICULTY: NO
ADLS_ACUITY_SCORE: 22
TOILETING_ISSUES: OTHER (SEE COMMENTS)
CHANGE_IN_FUNCTIONAL_STATUS_SINCE_ONSET_OF_CURRENT_ILLNESS/INJURY: NO
ADLS_ACUITY_SCORE: 26
DIFFICULTY_EATING/SWALLOWING: NO
ADLS_ACUITY_SCORE: 22
DIFFICULTY_COMMUNICATING: NO
ADLS_ACUITY_SCORE: 22
ADLS_ACUITY_SCORE: 22
VISION_MANAGEMENT: GLASSES
ADLS_ACUITY_SCORE: 22
WEAR_GLASSES_OR_BLIND: YES
CONCENTRATING,_REMEMBERING_OR_MAKING_DECISIONS_DIFFICULTY: NO
ADLS_ACUITY_SCORE: 22

## 2024-06-04 ASSESSMENT — ENCOUNTER SYMPTOMS: SEIZURES: 0

## 2024-06-04 ASSESSMENT — COPD QUESTIONNAIRES: COPD: 0

## 2024-06-04 NOTE — ANESTHESIA CARE TRANSFER NOTE
Patient: Radha Winchester    Procedure: Procedure(s):  neck exploration, 3 AND 1/2 GLAND PARATHYROIDECTOMY, remaining 1/2 right inferior parathyroid       Diagnosis: Secondary renal hyperparathyroidism (H24) [N25.81]  Diagnosis Additional Information: No value filed.    Anesthesia Type:   General     Note:    Oropharynx: oropharynx clear of all foreign objects and spontaneously breathing  Level of Consciousness: awake  Oxygen Supplementation: face mask  Level of Supplemental Oxygen (L/min / FiO2): 8  Independent Airway: airway patency satisfactory and stable  Dentition: dentition unchanged  Vital Signs Stable: post-procedure vital signs reviewed and stable  Report to RN Given: handoff report given  Patient transferred to: PACU    Handoff Report: Identifed the Patient, Identified the Reponsible Provider, Reviewed the pertinent medical history, Discussed the surgical course, Reviewed Intra-OP anesthesia mangement and issues during anesthesia, Set expectations for post-procedure period and Allowed opportunity for questions and acknowledgement of understanding      Vitals:  Vitals Value Taken Time   /66 06/04/24 1430   Temp     Pulse 87 06/04/24 1433   Resp 20 06/04/24 1433   SpO2 97 % 06/04/24 1433   Vitals shown include unfiled device data.    Electronically Signed By: BAHMAN Leung CRNA  June 4, 2024  2:34 PM

## 2024-06-04 NOTE — LETTER
Radha Winchester MRN# [id number]   YOB: 1955 Age: 68 year old     Date of Admission:  6/4/24  Date of Discharge:  6/9/2024  Admitting Physician:  Anita Santana MD    Primary Care Provider: Brigitte Browne     To Whom it May Concern:            You have been identified as the Primary Care Provider for Radha Winchester, who was recently admitted to the [facility].  Thank you for the referral to our hospital.  It is our goal to provide the highest quality of care for our patients, including planning for seamless continuity of care by providing you with timely, accurate and concise information.  After reviewing the following combined discharge summary and final progress note, please contact us if you have any remaining questions.  The Discharging Physician will be the best informed, with their contact information listed above.  If unable to reach them, or if you have received this letter in error, please call [phone] and someone will try to help you.

## 2024-06-04 NOTE — CONSULTS
Nephrology Initial Consult  June 4, 2024      Radha Winchester MRN:[id number] YOB: 1955  Date of Admission:6/4/2024  Primary care provider: Brigitte Browne  Requesting physician: Anita Santana MD    ASSESSMENT AND RECOMMENDATIONS:     #ESRD on HD  Dialysis at [facility] in [place] with Dr. Simpson on TTS schedule. Has been on dialysis for nearly 11 years. Run time 3.5 hours via left AVF. Target weight 63kg (although UF limited to 3L). Has previously had issues with hyperkalemia and therefore on lokelma. Took lokelma last PM.   -Would continue lokelma tonight if tolerating PO intake  -Will plan on HD first shift tomorrow AM    #S/p parathyroidectomy, concern for hungry bone  Intra-op PTH 1990, decreased to 145. Calcium, Mg, phos pending. Continued on 0.5mg calcitriol daily and started tums 1000mg QID.   -Agree with q6 monitoring of Ca, Phos, Mg  -Would administer IV calcium with 1-2g of IV calcium gluconate if iCa <4 or total Ca <8. Some cases of persistent hypocalcemia require a continuous infusion of Calcium. This concentration is usually 100 mL of 10% calcium gluconate in 1L of D5, started at a rate of 50mL/hr.  -Mg can be replaced IV if low   -Generally phosphorus replacement is avoided unless severe (<1)      Recommendations were communicated to primary team via note    Seen and discussed with Dr. Darby Mclain MD  Division of Renal Disease and Hypertension  [facility]  [employer]  Vocera Web Console        REASON FOR CONSULT: ESRD    HISTORY OF PRESENT ILLNESS:  Admitting provider and nursing notes reviewed  Radha Winchester is a 68 year old with history of HTN, HLD, multivessel CAD, DMII, and ESRD on HD (TTS). She has a history of secondary hyperparathyroidism and lytic bone lesions. Evaluated by oncology and ultimately underwent bone biopsy showing brown tumor. Now admitted for parathyroidecomy which she had on 6/4/24 with Dr. Santana.    Reports that she has been on dialysis for  11 years. This is her first time at [facility]. She reports no recent concerns with her dialysis runs. Some pain after surgery, but overall feeling okay. No issues with swelling or breathing today. Previously has had issues with hyperkalemia, but since being on lokelma has stabilized. She reports last HD was on Saturday and would typically run dialysis today. With her being post-op will plan on first shift tomorrow AM.    PAST MEDICAL HISTORY:  Reviewed with patient on 06/04/2024   Past Medical History:   Diagnosis Date    Chronic systolic heart failure (H)     Coronary artery disease     Diabetes (H)     Dialysis patient (H24)     Gastroesophageal reflux disease     Hypertension     Mixed hyperlipidemia     MRSA (methicillin resistant staphylococcus aureus) pneumonia (H)     NSTEMI (non-ST elevated myocardial infarction) (H)     Renal disease        Past Surgical History:   Procedure Laterality Date    AS CABG, ARTERIAL, THREE          MEDICATIONS:  PTA Meds  Prior to Admission medications    Medication Sig Last Dose Taking? Auth Provider Long Term End Date   amLODIPine (NORVASC) 10 MG tablet Amlodipine Oral    daily    active 6/4/2024 at 0530 Yes Reported, Patient Yes    aspirin 81 MG EC tablet Take 81 mg by mouth daily Past Week Yes Reported, Patient     carvedilol (COREG) 12.5 MG tablet Take 12.5 mg by mouth 2 times daily (with meals) 6/4/2024 at 0530 Yes Reported, Patient Yes    DULoxetine (CYMBALTA) 20 MG capsule TAKE 1 CAPSULE(20 MG) BY MOUTH EVERY DAY 6/3/2024 at 1900 Yes Reported, Patient Yes    enalapril (VASOTEC) 10 MG tablet Enalapril Oral    bid    active 6/3/2024 at 1900 Yes Reported, Patient Yes    fluticasone (FLONASE) 50 MCG/ACT nasal spray Spray 2 sprays in nostril More than a month Yes Reported, Patient     hydrOXYzine HCl (ATARAX) 10 MG tablet Hydroxyzine HCl Oral     1 to 2 every 8 hrs for anxiety   active 6/4/2024 Yes Reported, Patient     nitroGLYcerin (NITROSTAT) 0.4 MG sublingual tablet ONE  "TABLET UNDER TONGUE AS NEEDED FOR CHEST PAIN MAY REPEAT EVERY 5 MINUTES More than a month Yes Reported, Patient Yes    ondansetron (ZOFRAN) 4 MG tablet TAKE 1 TABLET(4 MG) BY MOUTH EVERY 8 HOURS AS NEEDED FOR NAUSEA OR VOMITING More than a month Yes Reported, Patient     sevelamer carbonate (RENVELA) 800 MG tablet Sevelamer Oral     take 5 tablets with meals and 3 tablets with snacks   active 6/3/2024 at 1600 Yes Reported, Patient     LOKELMA 10 g PACK packet TAKE 10 GRAMS THREE TIMES PER WEEK ON DIALYSIS DAYS AS DIRECTED   Reported, Patient        Current Meds  Current Facility-Administered Medications   Medication Dose Route Frequency Provider Last Rate Last Admin    calcitRIOL (ROCALTROL) capsule 0.5 mcg  0.5 mcg Oral Daily Liz Rivera MD        [START ON 6/5/2024] calcium carbonate (TUMS) chewable tablet 1,000 mg  1,000 mg Oral 4x Daily Liz Rivera MD        sodium chloride (PF) 0.9% PF flush 3 mL  3 mL Intracatheter Q8H Liz Rivera MD         Infusion Meds  Current Facility-Administered Medications   Medication Dose Route Frequency Provider Last Rate Last Admin    lactated ringers infusion   Intravenous Continuous David Luna APRN CRNA        remifentanil (ULTIVA) 2 mg in sodium chloride 0.9 % 40 mL infusion  0.2 mcg/kg/min Intravenous Continuous David Luna APRN CRNA   Stopped at 06/04/24 6067       ALLERGIES:    Allergies   Allergen Reactions    Lidocaine Dermatitis and Rash     Cream only; Applied topically to fistula; \"it was eating at my arm\"    Clopidogrel Other (See Comments)     NON-responder    Levofloxacin Diarrhea and Unknown    Metformin Diarrhea    Lorazepam Other (See Comments)    Povidone Iodine Itching and Rash       REVIEW OF SYSTEMS:  A comprehensive of systems was negative except as noted above.    SOCIAL HISTORY:   Social History     Socioeconomic History    Marital status: Single     Spouse name: Not on file    Number of children: Not on file    Years of education: " Not on file    Highest education level: Not on file   Occupational History    Not on file   Tobacco Use    Smoking status: Former     Current packs/day: 0.50     Average packs/day: 0.5 packs/day for 5.0 years (2.5 ttl pk-yrs)     Types: Cigarettes    Smokeless tobacco: Never    Tobacco comments:     Quit smoking  25 years ago    Substance and Sexual Activity    Alcohol use: Not Currently    Drug use: Never    Sexual activity: Not on file   Other Topics Concern    Not on file   Social History Narrative    Not on file     Social Determinants of Health     Financial Resource Strain: Low Risk  (10/7/2022)    [facility], [facility] [employer] [facility]    Financial Resource Strain     Difficulty of Paying Living Expenses: 3     Difficulty of Paying Living Expenses: Not on file   Food Insecurity: No Food Insecurity (10/7/2022)    [facility], [facility] [employer] [facility]    Food Insecurity     Worried About Running Out of Food in the Last Year: 1   Transportation Needs: No Transportation Needs (10/7/2022)    [facility] [employer] [facility], [facility] [employer] [facility]    Transportation Needs     Lack of Transportation (Medical): 1   Physical Activity: Not on file   Stress: Not on file   Social Connections: Unknown (10/8/2023)    [facility], [facility] [employer] [facility] [employer] [facility]    Social Connections     Frequency of Communication with Friends and Family: Not on file   Interpersonal Safety: Not on file   Housing Stability: Low Risk  (10/7/2022)    [facility] [employer] [facility], [facility] [employer] [facility] [employer] [facility]    Housing Stability     Unable to Pay for Housing in the Last Year: 1     Reviewed    FAMILY MEDICAL HISTORY:   History reviewed. No pertinent family  "history.  Reviewed    PHYSICAL EXAM:   Temp  Av.5  F (36.9  C)  Min: 98.3  F (36.8  C)  Max: 98.7  F (37.1  C)  Arterial Line BP  Min: 162/56  Max: 173/61  Arterial Line MAP (mmHg)  Av.5 mmHg  Min: 95 mmHg  Max: 102 mmHg      Pulse  Av.5  Min: 68  Max: 89 Resp  Av.5  Min: 11  Max: 19  SpO2  Av.6 %  Min: 91 %  Max: 99 %       BP (!) 151/60   Pulse 78   Temp 98.3  F (36.8  C) (Oral)   Resp 15   Ht 1.626 m (5' 4\")   Wt 67.9 kg (149 lb 11.1 oz)   SpO2 97%   BMI 25.69 kg/m     Date 24 0700 - 24 0659   Shift 3767-4516 6611-6476 9106-5625 24 Hour Total   INTAKE   I.V. 600   600   Shift Total(mL/kg) 600(8.84)   600(8.84)   OUTPUT   Blood 10   10   Shift Total(mL/kg) 10(0.15)   10(0.15)   Weight (kg) 67.9 67.9 67.9 67.9      Admit Weight: 67.9 kg (149 lb 11.1 oz)     GENERAL APPEARANCE: no distress, awake  EYES: no scleral icterus, pupils equal  Endo: no goiter, no moon facies  Lymphatics: no cervical or supraclavicular LAD  Pulmonary: lungs clear to auscultation with equal breath sounds bilaterally  CV: regular rhythm, normal rate, no rub   - Edema none  GI: soft, nontender, normal bowel sounds  MS: no evidence of inflammation in joints, no muscle tenderness  : no estrada  SKIN: no rash, warm, dry, no cyanosis  NEURO: face symmetric, no asterixis     LABS:   CMP  Recent Labs   Lab 24  1445 24  1107 24  1022   POTASSIUM  --  5.4*  --    *  --  119*     CBCNo lab results found in last 7 days.  INRNo lab results found in last 7 days.  ABGNo lab results found in last 7 days.   URINE STUDIES  No lab results found.  No lab results found.  PTH  Recent Labs   Lab Test 24  1438   PTHI 145*     IRON STUDIES  No lab results found.    IMAGING:  All imaging studies reviewed by me.     Yfn Mclain MD      "

## 2024-06-04 NOTE — ANESTHESIA PREPROCEDURE EVALUATION
"Anesthesia Pre-Procedure Evaluation    Patient: Radha Winchester   MRN: [id number] : 1955        Procedure : Procedure(s):  3 AND 1/2 GLAND PARATHYROIDECTOMY          Past Medical History:   Diagnosis Date    Chronic systolic heart failure (H)     Coronary artery disease     Diabetes (H)     Dialysis patient (H24)     Gastroesophageal reflux disease     Hypertension     Mixed hyperlipidemia     MRSA (methicillin resistant staphylococcus aureus) pneumonia (H)     NSTEMI (non-ST elevated myocardial infarction) (H)     Renal disease       Past Surgical History:   Procedure Laterality Date    AS CABG, ARTERIAL, THREE        Allergies   Allergen Reactions    Lidocaine Dermatitis and Rash     Cream only; Applied topically to fistula; \"it was eating at my arm\"    Clopidogrel Other (See Comments)     NON-responder    Levofloxacin Diarrhea and Unknown    Metformin Diarrhea    Lorazepam Other (See Comments)    Povidone Iodine Itching and Rash      Social History     Tobacco Use    Smoking status: Former     Current packs/day: 0.50     Average packs/day: 0.5 packs/day for 5.0 years (2.5 ttl pk-yrs)     Types: Cigarettes    Smokeless tobacco: Never    Tobacco comments:     Quit smoking  25 years ago    Substance Use Topics    Alcohol use: Not Currently      Wt Readings from Last 1 Encounters:   24 64 kg (141 lb)        Anesthesia Evaluation   Pt has had prior anesthetic.     No history of anesthetic complications       ROS/MED HX  ENT/Pulmonary:    (-) asthma, COPD and sleep apnea   Neurologic:    (-) no seizures and no CVA   Cardiovascular: Comment: ypertensive, dyslipidemic, insulin dependent diabetic woman with end stage renal disease and multivessel coronary artery disease. She has previously had stenting of the mid RCA, the mid circumflex and RCA with restenosis of the stents. She subsequently had coronary artery bypass surgery in 2022 with a LIMA to the LAD, saphenous vein graft to D1, saphenous " "vein graft to the circumflex.     (+) Dyslipidemia hypertension- Peripheral Vascular Disease-  CAD -  CABG-date: 2022. stent-                                    Echo: Date: Results:  Told by cardiologist that no urgent echo is needed  Stress Test:  Date: Results:    ECG Reviewed:  Date: Results:    Cath:  Date: Results:      METS/Exercise Tolerance: 3 - Able to walk 1-2 blocks without stopping Comment: Able to climb two flights of stairs for laundry;    Hematologic:  - neg hematologic  ROS     Musculoskeletal:  - neg musculoskeletal ROS     GI/Hepatic:     (+) GERD,                   Renal/Genitourinary:     (+) renal disease, type: ESRD, Pt requires dialysis, type: Hemodialysis,          Endo: Comment: Diet controlled DM    (+)  type II DM (5),       Diabetic complications: neuropathy.          (-) Type I DM   Psychiatric/Substance Use:    (-) psychiatric history   Infectious Disease:  - neg infectious disease ROS     Malignancy:   (+) Malignancy, History of Other.Other CA Remission status post.    Other:            Physical Exam    Airway        Mallampati: II   TM distance: > 3 FB   Neck ROM: full   Mouth opening: > 3 cm    Respiratory Devices and Support         Dental       (+) Modest Abnormalities - crowns, retainers, 1 or 2 missing teeth      Cardiovascular   cardiovascular exam normal          Pulmonary   pulmonary exam normal                OUTSIDE LABS:  CBC: No results found for: \"WBC\", \"HGB\", \"HCT\", \"PLT\"  BMP: No results found for: \"NA\", \"POTASSIUM\", \"CHLORIDE\", \"CO2\", \"BUN\", \"CR\", \"GLC\"  COAGS: No results found for: \"PTT\", \"INR\", \"FIBR\"  POC: No results found for: \"BGM\", \"HCG\", \"HCGS\"  HEPATIC: No results found for: \"ALBUMIN\", \"PROTTOTAL\", \"ALT\", \"AST\", \"GGT\", \"ALKPHOS\", \"BILITOTAL\", \"BILIDIRECT\", \"IVÁN\"  OTHER: No results found for: \"PH\", \"LACT\", \"A1C\", \"PAM\", \"PHOS\", \"MAG\", \"LIPASE\", \"AMYLASE\", \"TSH\", \"T4\", \"T3\", \"CRP\", \"SED\"    Anesthesia Plan    ASA Status:  3    NPO Status:  NPO Appropriate  " "  Anesthesia Type: General.     - Airway: ETT   Induction: Intravenous.   Maintenance: Balanced.   Techniques and Equipment:     - Lines/Monitors: 2nd IV, Arterial Line     Consents    Anesthesia Plan(s) and associated risks, benefits, and realistic alternatives discussed. Questions answered and patient/representative(s) expressed understanding.     - Discussed: Risks, Benefits and Alternatives for BOTH SEDATION and the PROCEDURE were discussed     - Discussed with:  Patient      - Extended Intubation/Ventilatory Support Discussed: No.      - Patient is DNR/DNI Status: No     Use of blood products discussed: No .     Postoperative Care    Pain management: IV analgesics, Multi-modal analgesia.   PONV prophylaxis: Ondansetron (or other 5HT-3)     Comments:               Sheryl Hansen MD    I have reviewed the pertinent notes and labs in the chart from the past 30 days and (re)examined the patient.  Any updates or changes from those notes are reflected in this note.             # Drug Induced Platelet Defect: home medication list includes an antiplatelet medication    Per cardiology note on 5/29/24 \"Radha Winchester is a very pleasant 68-year-old hypertensive, dyslipidemic, insulin dependent diabetic woman with end stage renal disease and multivessel coronary artery disease. She has previously had stenting of the mid RCA, the mid circumflex and RCA with restenosis of the stents. She subsequently had coronary artery bypass surgery in December of 2022 with a LIMA to the LAD, saphenous vein graft to D1, saphenous vein graft to the circumflex. The right is okay. She does not smoke, drink, or use any drugs. She is moderately active and is compliant with diet and medications. She goes to dialysis 3 x week on a regular basis. The patient has significant hyperparathyroidism. She is going to have parathyroidectomy. She is due for surgery. She denies chest pain, shortness of breath, orthopnea, PND, and palpitations. " "........    Chronic ischemic heart disease without active angina or heart failure. She is okay to proceed with surgery without any significant increase in risk. \"    "

## 2024-06-04 NOTE — ANESTHESIA PROCEDURE NOTES
Airway       Patient location during procedure: OR       Procedure Start/Stop Times: 6/4/2024 1:02 PM  Staff -        Other Anesthesia Staff: Delores Cannon       Performed By: SRNA  Consent for Airway        Urgency: elective  Indications and Patient Condition       Indications for airway management: richard-procedural         Mask difficulty assessment: 1 - vent by mask    Final Airway Details       Final airway type: endotracheal airway       Successful airway: Oral  Endotracheal Airway Details        ETT size (mm): 7.0       Cuffed: yes       Successful intubation technique: video laryngoscopy       VL Blade Size: MAC 3       Grade View of Cords: 1       Adjucts: stylet       Position: Center       Measured from: lips       Secured at (cm): 22       Bite block used: Soft    Post intubation assessment        Placement verified by: capnometry, equal breath sounds and chest rise        Number of attempts at approach: 1       Number of other approaches attempted: 0       Secured with: sutures       Ease of procedure: easy       Dentition: Lips/oral mucosa injury    Medication(s) Administered   Medication Administration Time: 6/4/2024 1:02 PM    Additional Comments       Lip cut on left upper lip

## 2024-06-04 NOTE — BRIEF OP NOTE
[facility]    Brief Operative Note    Pre-operative diagnosis: Secondary renal hyperparathyroidism (H24) [N25.81]  Post-operative diagnosis Same as pre-operative diagnosis    Procedure: neck exploration, 3 AND 1/2 GLAND PARATHYROIDECTOMY, remaining 1/2 right inferior parathyroid, N/A - Neck    Surgeon: Surgeons and Role:     * Anita Santana MD - Primary     * Liz Rivera MD - Resident - Assisting  Anesthesia: General   Estimated Blood Loss: 10    Drains: None  Specimens:   ID Type Source Tests Collected by Time Destination   1 : right superior parathyroid Tissue Parathyroid, Superior, Right SURGICAL PATHOLOGY EXAM Anita Santana MD 6/4/2024  1:41 PM    2 : biopsy right inferior parathyroid Biopsy Parathyroid, Inferior, Right SURGICAL PATHOLOGY EXAM Anita Santana MD 6/4/2024  1:44 PM    3 : left superior parathyroid Tissue Parathyroid, Superior, Left SURGICAL PATHOLOGY EXAM Anita Santana MD 6/4/2024  1:52 PM    4 : left inferior parathyroid Tissue Parathyroid, Inferior, Left SURGICAL PATHOLOGY EXAM Anita Santana MD 6/4/2024  1:58 PM      Findings:   Right inferior half parathyroid left in place, marked with prolene and clip .  Complications: None.  Implants: * No implants in log *      - Admit to surgery post-op  - 0.5 calcitriol qDaily  - 1000 tums QID  - Q6 Ca, Mg, Ph; will manage any hypocalcemia (Ca <8) with IV Ca  - PTH in PACU and qAM  -Nephrology for hungry bone

## 2024-06-04 NOTE — ANESTHESIA POSTPROCEDURE EVALUATION
Patient: Radha Winchester    Procedure: Procedure(s):  neck exploration, 3 AND 1/2 GLAND PARATHYROIDECTOMY, remaining 1/2 right inferior parathyroid       Anesthesia Type:  General    Note:  Disposition: Admission   Postop Pain Control: Uneventful            Sign Out: Well controlled pain   PONV:    Neuro/Psych: Uneventful            Sign Out: Acceptable/Baseline neuro status   Airway/Respiratory: Uneventful            Sign Out: Acceptable/Baseline resp. status   CV/Hemodynamics: Uneventful            Sign Out: Acceptable CV status; No obvious hypovolemia; No obvious fluid overload   Other NRE: NONE   DID A NON-ROUTINE EVENT OCCUR?            Last vitals:  Vitals Value Taken Time   /59 06/04/24 1730   Temp 36.8  C (98.3  F) 06/04/24 1530   Pulse 84 06/04/24 1752   Resp 18 06/04/24 1752   SpO2 93 % 06/04/24 1752   Vitals shown include unfiled device data.    Electronically Signed By: Sheryl Hansen MD  June 4, 2024  5:52 PM

## 2024-06-05 LAB
CALCIUM SERPL-MCNC: 7.2 MG/DL (ref 8.8–10.2)
CALCIUM SERPL-MCNC: 7.6 MG/DL (ref 8.8–10.2)
CALCIUM SERPL-MCNC: 7.8 MG/DL (ref 8.8–10.2)
CALCIUM SERPL-MCNC: 7.8 MG/DL (ref 8.8–10.2)
GLUCOSE BLDC GLUCOMTR-MCNC: 151 MG/DL (ref 70–99)
GLUCOSE BLDC GLUCOMTR-MCNC: 155 MG/DL (ref 70–99)
GLUCOSE BLDC GLUCOMTR-MCNC: 233 MG/DL (ref 70–99)
GLUCOSE BLDC GLUCOMTR-MCNC: 236 MG/DL (ref 70–99)
GLUCOSE BLDC GLUCOMTR-MCNC: 249 MG/DL (ref 70–99)
GLUCOSE BLDC GLUCOMTR-MCNC: 263 MG/DL (ref 70–99)
GLUCOSE BLDC GLUCOMTR-MCNC: 275 MG/DL (ref 70–99)
GLUCOSE BLDC GLUCOMTR-MCNC: 335 MG/DL (ref 70–99)
MAGNESIUM SERPL-MCNC: 2 MG/DL (ref 1.7–2.3)
MAGNESIUM SERPL-MCNC: 2.2 MG/DL (ref 1.7–2.3)
MAGNESIUM SERPL-MCNC: 2.4 MG/DL (ref 1.7–2.3)
MAGNESIUM SERPL-MCNC: 2.8 MG/DL (ref 1.7–2.3)
PHOSPHATE SERPL-MCNC: 4 MG/DL (ref 2.5–4.5)
PHOSPHATE SERPL-MCNC: 4.7 MG/DL (ref 2.5–4.5)
PHOSPHATE SERPL-MCNC: 5.9 MG/DL (ref 2.5–4.5)
PHOSPHATE SERPL-MCNC: 6.4 MG/DL (ref 2.5–4.5)
POTASSIUM SERPL-SCNC: 4.2 MMOL/L (ref 3.4–5.3)
POTASSIUM SERPL-SCNC: 5.6 MMOL/L (ref 3.4–5.3)
POTASSIUM SERPL-SCNC: 6.1 MMOL/L (ref 3.4–5.3)
PTH-INTACT SERPL-MCNC: 51 PG/ML (ref 15–65)

## 2024-06-05 PROCEDURE — 99232 SBSQ HOSP IP/OBS MODERATE 35: CPT | Mod: GC | Performed by: INTERNAL MEDICINE

## 2024-06-05 PROCEDURE — 84132 ASSAY OF SERUM POTASSIUM: CPT

## 2024-06-05 PROCEDURE — 82310 ASSAY OF CALCIUM: CPT | Performed by: STUDENT IN AN ORGANIZED HEALTH CARE EDUCATION/TRAINING PROGRAM

## 2024-06-05 PROCEDURE — 250N000012 HC RX MED GY IP 250 OP 636 PS 637

## 2024-06-05 PROCEDURE — 36415 COLL VENOUS BLD VENIPUNCTURE: CPT

## 2024-06-05 PROCEDURE — 36415 COLL VENOUS BLD VENIPUNCTURE: CPT | Performed by: STUDENT IN AN ORGANIZED HEALTH CARE EDUCATION/TRAINING PROGRAM

## 2024-06-05 PROCEDURE — 84100 ASSAY OF PHOSPHORUS: CPT | Performed by: STUDENT IN AN ORGANIZED HEALTH CARE EDUCATION/TRAINING PROGRAM

## 2024-06-05 PROCEDURE — 250N000013 HC RX MED GY IP 250 OP 250 PS 637: Performed by: STUDENT IN AN ORGANIZED HEALTH CARE EDUCATION/TRAINING PROGRAM

## 2024-06-05 PROCEDURE — 250N000013 HC RX MED GY IP 250 OP 250 PS 637

## 2024-06-05 PROCEDURE — 250N000011 HC RX IP 250 OP 636

## 2024-06-05 PROCEDURE — 99222 1ST HOSP IP/OBS MODERATE 55: CPT | Mod: GC | Performed by: STUDENT IN AN ORGANIZED HEALTH CARE EDUCATION/TRAINING PROGRAM

## 2024-06-05 PROCEDURE — 5A1D70Z PERFORMANCE OF URINARY FILTRATION, INTERMITTENT, LESS THAN 6 HOURS PER DAY: ICD-10-PCS | Performed by: INTERNAL MEDICINE

## 2024-06-05 PROCEDURE — 250N000011 HC RX IP 250 OP 636: Performed by: STUDENT IN AN ORGANIZED HEALTH CARE EDUCATION/TRAINING PROGRAM

## 2024-06-05 PROCEDURE — 83970 ASSAY OF PARATHORMONE: CPT | Performed by: STUDENT IN AN ORGANIZED HEALTH CARE EDUCATION/TRAINING PROGRAM

## 2024-06-05 PROCEDURE — 120N000002 HC R&B MED SURG/OB UMMC

## 2024-06-05 PROCEDURE — 258N000003 HC RX IP 258 OP 636: Performed by: INTERNAL MEDICINE

## 2024-06-05 PROCEDURE — 999N000127 HC STATISTIC PERIPHERAL IV START W US GUIDANCE

## 2024-06-05 PROCEDURE — 250N000013 HC RX MED GY IP 250 OP 250 PS 637: Performed by: SURGERY

## 2024-06-05 PROCEDURE — 258N000001 HC RX 258

## 2024-06-05 PROCEDURE — 83735 ASSAY OF MAGNESIUM: CPT | Performed by: STUDENT IN AN ORGANIZED HEALTH CARE EDUCATION/TRAINING PROGRAM

## 2024-06-05 PROCEDURE — 258N000003 HC RX IP 258 OP 636

## 2024-06-05 PROCEDURE — 90935 HEMODIALYSIS ONE EVALUATION: CPT

## 2024-06-05 RX ORDER — ROSUVASTATIN CALCIUM 10 MG/1
1 TABLET, COATED ORAL DAILY
COMMUNITY
Start: 2023-01-19

## 2024-06-05 RX ORDER — HYDROCODONE BITARTRATE AND ACETAMINOPHEN 5; 325 MG/1; MG/1
1 TABLET ORAL 2 TIMES DAILY PRN
Status: DISCONTINUED | OUTPATIENT
Start: 2024-06-05 | End: 2024-06-09 | Stop reason: HOSPADM

## 2024-06-05 RX ORDER — VIT B COMP NO.3/FOLIC/C/BIOTIN 1 MG-60 MG
1 TABLET ORAL DAILY
COMMUNITY

## 2024-06-05 RX ORDER — CLOBETASOL PROPIONATE 0.5 MG/G
CREAM TOPICAL DAILY PRN
COMMUNITY
Start: 2024-05-22

## 2024-06-05 RX ORDER — ZOLPIDEM TARTRATE 10 MG/1
10 TABLET ORAL
COMMUNITY

## 2024-06-05 RX ORDER — EZETIMIBE 10 MG/1
1 TABLET ORAL EVERY EVENING
COMMUNITY
Start: 2024-05-29

## 2024-06-05 RX ORDER — HYDROCODONE BITARTRATE AND ACETAMINOPHEN 5; 325 MG/1; MG/1
1 TABLET ORAL 2 TIMES DAILY PRN
COMMUNITY
Start: 2024-05-22

## 2024-06-05 RX ORDER — DULOXETIN HYDROCHLORIDE 20 MG/1
20 CAPSULE, DELAYED RELEASE ORAL EVERY EVENING
Status: DISCONTINUED | OUTPATIENT
Start: 2024-06-05 | End: 2024-06-09 | Stop reason: HOSPADM

## 2024-06-05 RX ORDER — CALCIUM GLUCONATE 20 MG/ML
2 INJECTION, SOLUTION INTRAVENOUS ONCE
Qty: 100 ML | Refills: 0 | Status: COMPLETED | OUTPATIENT
Start: 2024-06-05 | End: 2024-06-05

## 2024-06-05 RX ADMIN — ONDANSETRON 4 MG: 4 TABLET, ORALLY DISINTEGRATING ORAL at 23:49

## 2024-06-05 RX ADMIN — ACETAMINOPHEN 975 MG: 325 TABLET, FILM COATED ORAL at 11:08

## 2024-06-05 RX ADMIN — SODIUM ZIRCONIUM CYCLOSILICATE 10 G: 10 POWDER, FOR SUSPENSION ORAL at 00:50

## 2024-06-05 RX ADMIN — SEVELAMER CARBONATE 4000 MG: 800 TABLET, FILM COATED ORAL at 12:25

## 2024-06-05 RX ADMIN — OXYCODONE HYDROCHLORIDE 5 MG: 5 TABLET ORAL at 01:19

## 2024-06-05 RX ADMIN — CALCIUM GLUCONATE 3 G: 98 INJECTION, SOLUTION INTRAVENOUS at 04:30

## 2024-06-05 RX ADMIN — DEXTROSE MONOHYDRATE 25 G: 25 INJECTION, SOLUTION INTRAVENOUS at 00:44

## 2024-06-05 RX ADMIN — SEVELAMER CARBONATE 4000 MG: 800 TABLET, FILM COATED ORAL at 17:11

## 2024-06-05 RX ADMIN — CARVEDILOL 12.5 MG: 12.5 TABLET, FILM COATED ORAL at 17:06

## 2024-06-05 RX ADMIN — SODIUM CHLORIDE 250 ML: 9 INJECTION, SOLUTION INTRAVENOUS at 07:01

## 2024-06-05 RX ADMIN — CALCIUM CARBONATE (ANTACID) CHEW TAB 500 MG 1000 MG: 500 CHEW TAB at 19:38

## 2024-06-05 RX ADMIN — AMLODIPINE BESYLATE 10 MG: 10 TABLET ORAL at 11:07

## 2024-06-05 RX ADMIN — SODIUM CHLORIDE 300 ML: 9 INJECTION, SOLUTION INTRAVENOUS at 07:00

## 2024-06-05 RX ADMIN — CALCITRIOL 0.5 MCG: 0.5 CAPSULE ORAL at 11:08

## 2024-06-05 RX ADMIN — POLYETHYLENE GLYCOL 3350 17 G: 17 POWDER, FOR SOLUTION ORAL at 11:07

## 2024-06-05 RX ADMIN — DOCUSATE SODIUM 50 MG AND SENNOSIDES 8.6 MG 1 TABLET: 8.6; 5 TABLET, FILM COATED ORAL at 11:08

## 2024-06-05 RX ADMIN — ACETAMINOPHEN 975 MG: 325 TABLET, FILM COATED ORAL at 17:12

## 2024-06-05 RX ADMIN — ACETAMINOPHEN 975 MG: 325 TABLET, FILM COATED ORAL at 00:40

## 2024-06-05 RX ADMIN — HUMAN INSULIN 6.8 UNITS: 100 INJECTION, SOLUTION SUBCUTANEOUS at 00:44

## 2024-06-05 RX ADMIN — Medication: at 07:00

## 2024-06-05 RX ADMIN — CALCIUM GLUCONATE 2 G: 20 INJECTION, SOLUTION INTRAVENOUS at 00:52

## 2024-06-05 RX ADMIN — CALCIUM GLUCONATE 2 G: 20 INJECTION, SOLUTION INTRAVENOUS at 21:18

## 2024-06-05 RX ADMIN — OXYCODONE HYDROCHLORIDE 5 MG: 5 TABLET ORAL at 17:11

## 2024-06-05 RX ADMIN — CARVEDILOL 12.5 MG: 12.5 TABLET, FILM COATED ORAL at 12:25

## 2024-06-05 RX ADMIN — HYDROCODONE BITARTRATE AND ACETAMINOPHEN 1 TABLET: 5; 325 TABLET ORAL at 19:38

## 2024-06-05 RX ADMIN — CALCIUM CARBONATE (ANTACID) CHEW TAB 500 MG 1000 MG: 500 CHEW TAB at 11:08

## 2024-06-05 RX ADMIN — INSULIN ASPART 3 UNITS: 100 INJECTION, SOLUTION INTRAVENOUS; SUBCUTANEOUS at 04:31

## 2024-06-05 RX ADMIN — DULOXETINE HYDROCHLORIDE 20 MG: 20 CAPSULE, DELAYED RELEASE ORAL at 19:38

## 2024-06-05 RX ADMIN — CALCIUM CARBONATE (ANTACID) CHEW TAB 500 MG 1000 MG: 500 CHEW TAB at 17:12

## 2024-06-05 RX ADMIN — ONDANSETRON 4 MG: 4 TABLET, ORALLY DISINTEGRATING ORAL at 13:00

## 2024-06-05 ASSESSMENT — ACTIVITIES OF DAILY LIVING (ADL)
ADLS_ACUITY_SCORE: 26
ADLS_ACUITY_SCORE: 27
ADLS_ACUITY_SCORE: 26
ADLS_ACUITY_SCORE: 27
ADLS_ACUITY_SCORE: 26
ADLS_ACUITY_SCORE: 26
ADLS_ACUITY_SCORE: 27
ADLS_ACUITY_SCORE: 26

## 2024-06-05 NOTE — PHARMACY-ADMISSION MEDICATION HISTORY
Pharmacist Admission Medication History    Admission medication history is complete. The information provided in this note is only as accurate as the sources available at the time of the update.    Information Source(s): Patient, Clinic records, Hospital records, and CareEverywhere/SureScripts via in-person    Pertinent Information: Patient is a reliable historian of her medications prior to admission and was able to verify her allergy list. Pertinent information regarding medications as follows:  Lokelma: reports taking 5 g daily; last filled for 10 g TID on dialysis days as directed on 23  Zolpidem 10 mg: reports taking as needed for sleep and recalls taking it in the past month; no fills per fill history and per chart review, last filled in 2018. Patient supply on hand is  and recommend re-ordering new prescription if patient intends to continue using.  Sevelamer 800 mg: Rx filled for 4 tablets by mouth TID; patient reports taking 5 tablets with meals and 3 tablets with snacks    Changes made to PTA medication list:  Added:   Clobetasol 0.05% cream  Lokelma 5 g  Zolpidem 10 mg  Deleted:   Lokelma 10 g: switched to 5 g  Changed:   Duloxetine 20 mg: changed from daily > every evening  Ezetimibe 10 mg: changed from daily > every evening    Allergies reviewed with patient and updates made in EHR: yes    Medication History Completed By: Balaji Miller RPH 2024 11:26 AM    PTA Med List   Medication Sig Last Dose    amLODIPine (NORVASC) 10 MG tablet Take 10 mg by mouth daily 2024 at 0530    aspirin 81 MG EC tablet Take 81 mg by mouth daily Past Week    B Complex-C-Folic Acid (VINAYAK-ASTON RX) 1 mg TABS Take 1 mg by mouth daily     carvedilol (COREG) 12.5 MG tablet Take 12.5 mg by mouth 2 times daily (with meals) 2024 at 0530    clobetasol propionate (TEMOVATE) 0.05 % external cream Apply topically daily as needed (rash)     DULoxetine (CYMBALTA) 20 MG capsule Take 20 mg by mouth every evening 6/3/2024  at 1900    enalapril (VASOTEC) 10 MG tablet Enalapril Oral    bid    active 6/3/2024 at 1900    ezetimibe (ZETIA) 10 MG tablet Take 1 tablet by mouth every evening     fluticasone (FLONASE) 50 MCG/ACT nasal spray Spray 2 sprays in nostril as needed More than a month    HYDROcodone-acetaminophen (NORCO) 5-325 MG tablet Take 1 tablet by mouth 2 times daily as needed for pain     hydrOXYzine HCl (ATARAX) 10 MG tablet Take 10-20 mg by mouth every 8 hours as needed for anxiety 6/4/2024    nitroGLYcerin (NITROSTAT) 0.4 MG sublingual tablet ONE TABLET UNDER TONGUE AS NEEDED FOR CHEST PAIN MAY REPEAT EVERY 5 MINUTES More than a month    ondansetron (ZOFRAN) 4 MG tablet TAKE 1 TABLET(4 MG) BY MOUTH EVERY 8 HOURS AS NEEDED FOR NAUSEA OR VOMITING More than a month    rosuvastatin (CRESTOR) 10 MG tablet Take 1 tablet by mouth daily     sevelamer carbonate (RENVELA) 800 MG tablet Sevelamer Oral     take 5 tablets with meals and 3 tablets with snacks   active 6/3/2024 at 1600    sodium zirconium cyclosilicate (LOKELMA) 5 g PACK packet Take 5 g by mouth daily     zolpidem (AMBIEN) 10 MG tablet Take 10 mg by mouth nightly as needed for sleep Past Month

## 2024-06-05 NOTE — PROVIDER NOTIFICATION
7B 7233-1 Radha Winchester,      this . Order states to page for BG >180. She is going to dialysis in the next 15 min.     Thanks,     Jessie Parkinson [phone]      Paged: General Surgery- Neena Oliveros MD

## 2024-06-05 NOTE — PROVIDER NOTIFICATION
7B Radha Winchester [id number],     Pt.  K+ 5.4 this AM. Can we do a K+ recheck? Pt. requesting atorvastatin 10 mg, coreg 12.5mg, enalapril 10 mg, ezetimibe 10 mg, Atarax 10 mg for tonight.     Thanks,     Jessie CARSON [phone]     Paged: General Surgery Zander Gómez MD

## 2024-06-05 NOTE — PROVIDER NOTIFICATION
Notified intern Lizzie Barrios    [facility] room 233 needs a code status placed. Do you want to order a time to recheck potassium this afternoon. At dialysis presently. 7B JEFF Jha V [phone]

## 2024-06-05 NOTE — OP NOTE
Otolaryngology Operative Report   Date of Operation: 2024  Patient Name: Radha Winchester  Patient : 1955   Patient MRN: [id number]    Staff Surgeon: Anita Santana MD  Resident Surgeon: Liz Rivera MD  Preoperative Diagnosis:   1.  Secondary renal hyperparathyroidism  Postoperative Diagnosis: Same  Procedure:   Neck exploration with removal of 3 and half parathyroid gland, right inferior half parathyroid gland remaining  Anesthesia: General  Findings: Four gland exploration completed; bilateral superior parathyroid glands removed,left inferior parathyroid gland removed, half of the RIGHT inferior parathyroid gland removed, remaining half marked with Prolene and surgical clips and left in place.  All intraoperative frozen sections returned with cellular parathyroid tissue   EBL: 1 cc  Complications: None  Specimens:   ID Type Source Tests Collected by Time Destination   1 : right superior parathyroid Tissue Parathyroid, Superior, Right SURGICAL PATHOLOGY EXAM Anita Santana MD 2024  1:41 PM     2 : biopsy right inferior parathyroid Biopsy Parathyroid, Inferior, Right SURGICAL PATHOLOGY EXAM Anita Santana MD 2024  1:44 PM     3 : left superior parathyroid Tissue Parathyroid, Superior, Left SURGICAL PATHOLOGY EXAM Anita Santana MD 2024  1:52 PM     4 : left inferior parathyroid Tissue Parathyroid, Inferior, Left SURGICAL PATHOLOGY EXAM Anita Santana MD 2024  1:58 PM         Clinical Indications: Radha Winchester is a 68 year old female with history end-stage renal disease on hemodialysis with secondary hyperparathyroidism with diffuse brown bone tumors and was recommended to undergo aforementioned procedure. All risks and benefits were discussed with the consenting party and they elected to proceed with the procedure.  Description of Procedure: The patient was brought to the operating room and placed in supine position on the operating table. General  anesthesia was induced with the NIM endotracheal tube. Grounding electrodes were placed in the shoulder. The patient was then prepped and draped in the usual fashion. A time-out was performed to confirm the identity of this patient, the procedure to be done, and the site of surgery.   A midline neck incision was made with a #15 blade and carried through skin and subcutaneous tissues, platysma was incised with monopolar cautery.  Limited subplatysmal flaps were made superiorly and inferiorly.  The median raphae was identified and divided and strap muscles were reflected laterally.  Strap muscles were lateralized on the right side, the thyroid lobe grasped with the right angle clamp and reflected medially.  Loose areolar fascia was released off of the thyroid superiorly and the superior parathyroid gland was identified on the right and removed, the right inferior was then identified and found to larger than the superior, this was cut in half using a #15 blade and hemostasis obtained with bipolar cautery.  The half was sent for frozen section.  Then we proceeded to the left side, the strap musculature was reflected laterally in a similar fashion and the left superior parathyroid gland was found and removed to be sent for pathology. The left inferior parathyroid gland was identified and and sent for pathology. Hemostasis was achieved and fibrillar placed in the paratracheal space.  We then reassessed our right inferior parathyroid gland, this appeared to have good vascularity, this was using a 4-0 Prolene to the inferior right thyroid lobe.  Several knots were thrown for the Prolene with intermittent surgical clips so that can be easily identified on future thyroid ultrasound with surveillance imaging.  Strap musculature and platysma were reapproximated with 3-0 chromic suture.  The skin was closed with 5-0 Monocryl in a running subcuticular fashion and the skin dressed with Dermabond.  This concluded our procedure. The  patient was then turned over to our anesthesia colleagues and was extubated and taken to the PACU in satisfactory and stable condition.   Dr. Santana was present for the procedure    I was present for the entire surgical procedure and agree with the above note. I performed the critical components of the surgery. Remaining 1/2 of RIGHT INFERIOR in place and marked.    Anita Santana MD

## 2024-06-05 NOTE — PLAN OF CARE
Goal Outcome Evaluation:    Temp: 97.6  F (36.4  C) Temp src: Oral BP: 128/48 Pulse: 68   Resp: 20 SpO2: 93 % O2 Device: Nasal cannula Oxygen Delivery: 1 LPM     Neuro: Alert and Oriented  x4, let needs known  Cardiac: WNL, denies cardiac chest pain, on continuous cardiac monitoring for K+ levels  Respiratory:LS clear and dim on room air   GI/: Anuric on HD, last BM 6-4; passing gas  Diet/Appetite: regular, one episode of emesis; gave PRN PO zofran x1  Skin: blanchable redness to coccyx; neck incision  LDA: x2 PIV SL; L AV fistula  Activity: Up SBA  Pain: denies  Plan: continue plan of care

## 2024-06-05 NOTE — PLAN OF CARE
"BP (!) 153/61 (BP Location: Right arm, Patient Position: Semi-Phelps's, Cuff Size: Adult Regular)   Pulse 67   Temp 97.9  F (36.6  C) (Oral)   Resp 16   Ht 1.626 m (5' 4\")   Wt 67.9 kg (149 lb 11.1 oz)   SpO2 95%   BMI 25.69 kg/m        A&O x 4. Reports 4/10 incisional pain. Gave PRN oxycodone and PRN tylenol. Anterior neck incision. PIV x 2 SL. Potassium 6.2 and 6.1-Gave calcium gluconate x 2, insulin x 2 and one time lokelma. Writer called lab several times to draw 0300 potassium lab. Lab drawn this AM at 0600. PIV x 2 SL. Anuric. No BM this shift. Hemodialysis this AM. Left arm AV fistula.       Goal Outcome Evaluation: Ongoing.       Plan of Care Reviewed With: patient    Overall Patient Progress: no change      Admitted/transferred from: PACU   2 RN full   skin assessment completed by Jessie Parkinson RN and Anna MCCARTY RN.  Skin assessment finding: issues found Left arm AV fistula. PIV. Dry skin. Amputated toes on bilateral feet.     Interventions/actions: skin interventions Applied lotion to bilateral LE's, mepilex to sacrum. Encouraged fluid intake and weight shifting in bed.     Bedside Emergency Equipment Present:  Suction Regulator: Yes  Suction Canister: Yes  Tubing between Regulator and Canister: Yes  O2 Regulator with Tree: Yes  Ambu Bag: Yes   "

## 2024-06-05 NOTE — PROGRESS NOTES
Surgical Oncology Progress Note    Interval History:  POD 1  K 6.2 and Ca 7.2. Received Ca gluconate 2g, Ca gluconate 3 g, dextrose and insulin in addition to Calcitrol 0.5 mcg daily and Ca carb 100 mg QID. Going down for dialysis this morning. Reports feeling well. Denies surgical site pain or voice change.     Physical Exam:   Temp:  [97.9  F (36.6  C)-98.7  F (37.1  C)] 97.9  F (36.6  C)  Pulse:  [65-89] 66  Resp:  [11-21] 20  BP: (126-177)/(49-80) 149/64  Cuff Mean (mmHg):  [86-89] 89  MAP:  [95 mmHg-102 mmHg] 95 mmHg  Arterial Line BP: (162-173)/(56-61) 162/56  SpO2:  [86 %-100 %] 99 %  General: Alert, oriented, appears comfortable, NAD.  Respiratory: breathing non labored  Neck incision is clean, dry and intact. No swelling or bruising.     Data:   All laboratory and imaging data in the past 24 hours reviewed  I/O last 3 completed shifts:  In: 1060 [P.O.:460; I.V.:600]  Out: 10 [Blood:10]  No lab results found.   Recent Labs   Lab Test 06/05/24  0558 06/05/24  0545 06/05/24  0434 06/05/24  0301 06/05/24  0109 06/05/24  0056 06/05/24  0040 06/04/24  2211   POTASSIUM 5.6*  --   --   --   --  6.1*  --  6.2*   PAM 7.8*  --   --   --   --  7.2*  --  7.3*   GLC  --  233* 236* 249*   < >  --    < >  --     < > = values in this interval not displayed.      Recent Labs   Lab Test 06/04/24  1107   ALBUMIN 4.2     Assessment and Plan:     Radha Winchester is a 68 year old year old female with ESRD on HD and secondary renal hyperparathyroidism.     POD 1 s/p neck exploration, 3 and 1/2 gland parathyroidectomy with Dr. Santana 6/4/24.     Now with bone hunger.     - Medicine team consultation  - Appreciate Nephrology management of bone hunger and ESRD.   - Available for pain is scheduled acetaminophen and oxycodone prn.    - Regular diet.      Discussed with Dr. Santana.     MICHELLE HerreraC   Surgical Oncology   [phone]

## 2024-06-05 NOTE — PROGRESS NOTES
Brief Surgery Note    Patient seen on PM rounds. Doing well. Pain well controlled.  Incision c/d/I. No voice hoarseness.   Per patient, plan for dialysis in the AM.    Transfer patient - medicine primary.  Appreciate nephrology assistance.  Please call surgery with post op questions.    Lizzie Barrios MD  General Surgery Resident

## 2024-06-05 NOTE — PROVIDER NOTIFICATION
7B 9633-1 Radha Winchester,     Can you place a cardiac monitoring order for this pt?     Thanks,     Jessie Parkinson [phone]    Paged: Zander Gómez MD

## 2024-06-05 NOTE — PROGRESS NOTES
HEMODIALYSIS TREATMENT NOTE    Date: 6/5/2024  Time: 5:38 PM    Data:   Weight change: 3.0kg  Ultrafiltration - Post Run Net Total Removed (mL): 3000 mL  Vascular Access Status: patent  Dialyzer Rinse: Streaked  Total Blood Volume Processed: 84.56 L Liters  Total Dialysis (Treatment) Time: 3.5 Hours  K2Ca2.5     AVF 15g x 2 successful   Lab:   Yes    Interventions:  Repeat BMP. Pt received Epo ivp- see MAR per time and dose. Cannulated w 15g x 2 successfully.  . K2CA2.5. Repeat K prior to initiation. Hemostasis obtained and pt transferred back to room.      Assessment:  A&ox4. HR-RRR. 20rpm. Lung sounds diminished ant & post BUL/BLL. Trace edema present in ble. Pt stated pain under control. B/t+. Cannulated w 15g x 2, pt turned and repositioned.       Plan:    Per renal and pcn

## 2024-06-05 NOTE — PROVIDER NOTIFICATION
7B 7233-1 Bela Winchester,    Initial BG was 275. I pushed 25 mL of dextrose 50 % injection and then I stopped. Her BG was 335 upon re-check. Do you want to order additional insulin?     Thanks,   Jessie CARSON -[phone]      7B 7233-1 Bela Winchester,   FYI:   I did not give the dextrose 10% bolus.     Jessie CARSON -[phone]

## 2024-06-05 NOTE — PROGRESS NOTES
Nephrology Progress Note  06/05/2024         Assessment & Recommendations:   Radha Winchester is a 68 year old with history of HTN, HLD, multivessel CAD, DMII, and ESRD on HD (TTS). She has a history of secondary hyperparathyroidism and lytic bone lesions. Evaluated by oncology and ultimately underwent bone biopsy showing brown tumor. Now admitted for parathyroidecomy which she had on 6/4/24 with Dr. Santana.     #ESRD on HD  Dialysis at [facility] with Dr. Simpson on TTS schedule. Has been on dialysis for nearly 11 years. Run time 3.5 hours via left AVF. Target weight 63kg (although UF limited to 3L). Has previously had issues with hyperkalemia and therefore on lokelma.  -Would continue PTA lokelma   -HD today and tomorrow AM to return to TTS schedule     #S/p parathyroidectomy, concern for hungry bone  Intra-op PTH 1990, decreased to 145. Calcium, Mg, phos pending. Continued on 0.5mg calcitriol daily and started tums 1000mg QID.   -Agree with q6 monitoring of Ca, Phos, Mg  -Would administer IV calcium with 1-2g of IV calcium gluconate if iCa <4 or total Ca <8. Some cases of persistent hypocalcemia require a continuous infusion of Calcium. This concentration is usually 100 mL of 10% calcium gluconate in 1L of D5, started at a rate of 50mL/hr.  -Mg can be replaced IV if low   -Generally phosphorus replacement is avoided unless severe (<1)    Recommendations were communicated to primary team verbally    Seen and discussed with Dr. Darby Mclain MD   Division of Renal Disease and Hypertension  [facility]  [employer]  Vocera Web Console      Interval History :   Nursing and provider notes from last 24 hours reviewed.  In the last 24 hours Radha Winchester reports feeling well this morning. Pain controlled after procedure. Seen on HD and tolerating well. Received Ca IV overnight and Ca mildly low this AM.     Physical Exam:   I/O last 3 completed shifts:  In: 1060 [P.O.:460; I.V.:600]  Out: 10 [Blood:10]  "  BP (!) 148/62 (BP Location: Right arm, Cuff Size: Adult Regular)   Pulse 73   Temp 97.9  F (36.6  C) (Oral)   Resp 21   Ht 1.626 m (5' 4\")   Wt 67.9 kg (149 lb 11.1 oz)   SpO2 100%   BMI 25.69 kg/m       GENERAL APPEARANCE: Alert, no distress  EYES:  no scleral icterus, pupils equal  HENT: mouth without ulcers or lesions  PULM: lungs clear to auscultation bilaterally, equal air movement,  CV: regular rhythm, normal rate, no rub     -edema trace in hands   GI: soft, nontender, nondistended  INTEGUMENT: no cyanosis, no rash  NEURO:  no asterixis   Access none    Labs:   All labs reviewed by me  Electrolytes/Renal -   Recent Labs   Lab Test 06/05/24  0710 06/05/24  0558 06/05/24  0545 06/05/24  0434 06/05/24  0301 06/05/24  0109 06/05/24  0056 06/05/24  0040 06/04/24  2211   POTASSIUM  --  5.6*  --   --   --   --  6.1*  --  6.2*   GLC  --   --  233* 236* 249*   < >  --    < >  --    PAM 7.6* 7.8*  --   --   --   --  7.2*  --  7.3*   MAG 2.2 2.8*  --   --   --   --  2.4*  --  2.4*   PHOS 4.7* 5.9*  --   --   --   --  6.4*  --  6.6*    < > = values in this interval not displayed.       CBC - No lab results found.    LFTs -   Recent Labs   Lab Test 06/04/24  1107   ALBUMIN 4.2       Iron Panel - No lab results found.      Imaging:  All imaging studies reviewed by me.     Current Medications:  Current Facility-Administered Medications   Medication Dose Route Frequency Provider Last Rate Last Admin    acetaminophen (TYLENOL) tablet 975 mg  975 mg Oral Q8H Liz Rivera MD   975 mg at 06/05/24 0040    amLODIPine (NORVASC) tablet 10 mg  10 mg Oral Daily Zander Gómez MD        calcitRIOL (ROCALTROL) capsule 0.5 mcg  0.5 mcg Oral Daily Liz Rivera MD   0.5 mcg at 06/04/24 1939    calcium carbonate (TUMS) chewable tablet 1,000 mg  1,000 mg Oral 4x Daily Liz Rivera MD        carvedilol (COREG) tablet 12.5 mg  12.5 mg Oral BID w/meals Zander Gómez MD        DULoxetine (CYMBALTA) DR capsule 20 mg  20 mg " Oral Daily Zander Gómez MD        polyethylene glycol (MIRALAX) Packet 17 g  17 g Oral Daily Liz Rivera MD        senna-docusate (SENOKOT-S/PERICOLACE) 8.6-50 MG per tablet 1 tablet  1 tablet Oral BID Liz Rivera MD   1 tablet at 06/04/24 2105    sevelamer carbonate (RENVELA) tablet 4,000 mg  4,000 mg Oral TID w/meals Anita Santana MD   4,000 mg at 06/04/24 2103    sodium chloride (PF) 0.9% PF flush 3 mL  3 mL Intracatheter Q8H Liz Rivera MD         Current Facility-Administered Medications   Medication Dose Route Frequency Provider Last Rate Last Admin     Yfn Mcalin MD

## 2024-06-05 NOTE — PROVIDER NOTIFICATION
7B 7233-1 Radha Winchester,     Critical potassium 6.2.        Thanks,     Jessie CARSON -[phone]    Paged: General Surgery Zander Gómez MD

## 2024-06-06 PROBLEM — E83.81 HUNGRY BONE SYNDROME: Status: ACTIVE | Noted: 2024-06-06

## 2024-06-06 LAB
ANION GAP SERPL CALCULATED.3IONS-SCNC: 17 MMOL/L (ref 7–15)
BUN SERPL-MCNC: 45 MG/DL (ref 8–23)
CA-I BLD-MCNC: 3.3 MG/DL (ref 4.4–5.2)
CALCIUM SERPL-MCNC: 7.5 MG/DL (ref 8.8–10.2)
CALCIUM SERPL-MCNC: 7.9 MG/DL (ref 8.8–10.2)
CALCIUM SERPL-MCNC: 8.7 MG/DL (ref 8.8–10.2)
CHLORIDE SERPL-SCNC: 94 MMOL/L (ref 98–107)
CREAT SERPL-MCNC: 4.86 MG/DL (ref 0.51–0.95)
DEPRECATED HCO3 PLAS-SCNC: 23 MMOL/L (ref 22–29)
EGFRCR SERPLBLD CKD-EPI 2021: 9 ML/MIN/1.73M2
ERYTHROCYTE [DISTWIDTH] IN BLOOD BY AUTOMATED COUNT: 14.6 % (ref 10–15)
GLUCOSE BLDC GLUCOMTR-MCNC: 157 MG/DL (ref 70–99)
GLUCOSE SERPL-MCNC: 154 MG/DL (ref 70–99)
HCT VFR BLD AUTO: 25.7 % (ref 35–47)
HGB BLD-MCNC: 8.4 G/DL (ref 11.7–15.7)
MAGNESIUM SERPL-MCNC: 1.8 MG/DL (ref 1.7–2.3)
MAGNESIUM SERPL-MCNC: 1.8 MG/DL (ref 1.7–2.3)
MAGNESIUM SERPL-MCNC: 2.1 MG/DL (ref 1.7–2.3)
MAGNESIUM SERPL-MCNC: 2.2 MG/DL (ref 1.7–2.3)
MCH RBC QN AUTO: 33.1 PG (ref 26.5–33)
MCHC RBC AUTO-ENTMCNC: 32.7 G/DL (ref 31.5–36.5)
MCV RBC AUTO: 101 FL (ref 78–100)
PHOSPHATE SERPL-MCNC: 2.2 MG/DL (ref 2.5–4.5)
PHOSPHATE SERPL-MCNC: 3.4 MG/DL (ref 2.5–4.5)
PHOSPHATE SERPL-MCNC: 4.6 MG/DL (ref 2.5–4.5)
PHOSPHATE SERPL-MCNC: 4.9 MG/DL (ref 2.5–4.5)
PLATELET # BLD AUTO: 174 10E3/UL (ref 150–450)
POTASSIUM SERPL-SCNC: 4 MMOL/L (ref 3.4–5.3)
POTASSIUM SERPL-SCNC: 4.4 MMOL/L (ref 3.4–5.3)
PTH-INTACT SERPL-MCNC: 57 PG/ML (ref 15–65)
RBC # BLD AUTO: 2.54 10E6/UL (ref 3.8–5.2)
SODIUM SERPL-SCNC: 134 MMOL/L (ref 135–145)
WBC # BLD AUTO: 7.6 10E3/UL (ref 4–11)

## 2024-06-06 PROCEDURE — 250N000013 HC RX MED GY IP 250 OP 250 PS 637: Performed by: SURGERY

## 2024-06-06 PROCEDURE — 250N000013 HC RX MED GY IP 250 OP 250 PS 637: Performed by: STUDENT IN AN ORGANIZED HEALTH CARE EDUCATION/TRAINING PROGRAM

## 2024-06-06 PROCEDURE — 82310 ASSAY OF CALCIUM: CPT

## 2024-06-06 PROCEDURE — 99233 SBSQ HOSP IP/OBS HIGH 50: CPT | Mod: FS | Performed by: PHYSICIAN ASSISTANT

## 2024-06-06 PROCEDURE — 250N000011 HC RX IP 250 OP 636: Mod: JZ | Performed by: STUDENT IN AN ORGANIZED HEALTH CARE EDUCATION/TRAINING PROGRAM

## 2024-06-06 PROCEDURE — 84100 ASSAY OF PHOSPHORUS: CPT | Performed by: STUDENT IN AN ORGANIZED HEALTH CARE EDUCATION/TRAINING PROGRAM

## 2024-06-06 PROCEDURE — 83970 ASSAY OF PARATHORMONE: CPT | Performed by: STUDENT IN AN ORGANIZED HEALTH CARE EDUCATION/TRAINING PROGRAM

## 2024-06-06 PROCEDURE — 250N000013 HC RX MED GY IP 250 OP 250 PS 637

## 2024-06-06 PROCEDURE — 82330 ASSAY OF CALCIUM: CPT | Performed by: INTERNAL MEDICINE

## 2024-06-06 PROCEDURE — 99232 SBSQ HOSP IP/OBS MODERATE 35: CPT | Mod: GC | Performed by: STUDENT IN AN ORGANIZED HEALTH CARE EDUCATION/TRAINING PROGRAM

## 2024-06-06 PROCEDURE — 36415 COLL VENOUS BLD VENIPUNCTURE: CPT | Performed by: INTERNAL MEDICINE

## 2024-06-06 PROCEDURE — 120N000002 HC R&B MED SURG/OB UMMC

## 2024-06-06 PROCEDURE — 84132 ASSAY OF SERUM POTASSIUM: CPT

## 2024-06-06 PROCEDURE — 90937 HEMODIALYSIS REPEATED EVAL: CPT

## 2024-06-06 PROCEDURE — 83735 ASSAY OF MAGNESIUM: CPT | Performed by: STUDENT IN AN ORGANIZED HEALTH CARE EDUCATION/TRAINING PROGRAM

## 2024-06-06 PROCEDURE — 250N000011 HC RX IP 250 OP 636: Performed by: STUDENT IN AN ORGANIZED HEALTH CARE EDUCATION/TRAINING PROGRAM

## 2024-06-06 PROCEDURE — 258N000003 HC RX IP 258 OP 636: Performed by: INTERNAL MEDICINE

## 2024-06-06 PROCEDURE — 85027 COMPLETE CBC AUTOMATED: CPT | Performed by: STUDENT IN AN ORGANIZED HEALTH CARE EDUCATION/TRAINING PROGRAM

## 2024-06-06 PROCEDURE — 36415 COLL VENOUS BLD VENIPUNCTURE: CPT | Performed by: STUDENT IN AN ORGANIZED HEALTH CARE EDUCATION/TRAINING PROGRAM

## 2024-06-06 PROCEDURE — 250N000011 HC RX IP 250 OP 636

## 2024-06-06 PROCEDURE — 80048 BASIC METABOLIC PNL TOTAL CA: CPT | Performed by: STUDENT IN AN ORGANIZED HEALTH CARE EDUCATION/TRAINING PROGRAM

## 2024-06-06 PROCEDURE — 82310 ASSAY OF CALCIUM: CPT | Performed by: STUDENT IN AN ORGANIZED HEALTH CARE EDUCATION/TRAINING PROGRAM

## 2024-06-06 PROCEDURE — 84100 ASSAY OF PHOSPHORUS: CPT

## 2024-06-06 PROCEDURE — 83735 ASSAY OF MAGNESIUM: CPT

## 2024-06-06 PROCEDURE — 36415 COLL VENOUS BLD VENIPUNCTURE: CPT

## 2024-06-06 RX ORDER — ALBUTEROL SULFATE 0.83 MG/ML
2.5 SOLUTION RESPIRATORY (INHALATION)
Status: CANCELLED | OUTPATIENT
Start: 2024-06-10

## 2024-06-06 RX ORDER — HEPARIN SODIUM (PORCINE) LOCK FLUSH IV SOLN 100 UNIT/ML 100 UNIT/ML
5 SOLUTION INTRAVENOUS
Status: CANCELLED | OUTPATIENT
Start: 2024-06-10

## 2024-06-06 RX ORDER — EPINEPHRINE 1 MG/ML
0.3 INJECTION, SOLUTION, CONCENTRATE INTRAVENOUS EVERY 5 MIN PRN
Status: CANCELLED | OUTPATIENT
Start: 2024-06-10

## 2024-06-06 RX ORDER — CALCIUM GLUCONATE 20 MG/ML
1 INJECTION, SOLUTION INTRAVENOUS ONCE
Status: COMPLETED | OUTPATIENT
Start: 2024-06-06 | End: 2024-06-07

## 2024-06-06 RX ORDER — METHYLPREDNISOLONE SODIUM SUCCINATE 125 MG/2ML
125 INJECTION, POWDER, LYOPHILIZED, FOR SOLUTION INTRAMUSCULAR; INTRAVENOUS
Status: CANCELLED
Start: 2024-06-10

## 2024-06-06 RX ORDER — HEPARIN SODIUM,PORCINE 10 UNIT/ML
5-20 VIAL (ML) INTRAVENOUS DAILY PRN
Status: CANCELLED | OUTPATIENT
Start: 2024-06-10

## 2024-06-06 RX ORDER — CALCIUM GLUCONATE 20 MG/ML
2 INJECTION, SOLUTION INTRAVENOUS ONCE
Status: COMPLETED | OUTPATIENT
Start: 2024-06-06 | End: 2024-06-06

## 2024-06-06 RX ORDER — CALCIUM GLUCONATE 20 MG/ML
2 INJECTION, SOLUTION INTRAVENOUS
Status: CANCELLED
Start: 2024-06-10

## 2024-06-06 RX ORDER — DIPHENHYDRAMINE HYDROCHLORIDE 50 MG/ML
50 INJECTION INTRAMUSCULAR; INTRAVENOUS
Status: CANCELLED
Start: 2024-06-10

## 2024-06-06 RX ORDER — MEPERIDINE HYDROCHLORIDE 25 MG/ML
25 INJECTION INTRAMUSCULAR; INTRAVENOUS; SUBCUTANEOUS EVERY 30 MIN PRN
Status: CANCELLED | OUTPATIENT
Start: 2024-06-10

## 2024-06-06 RX ORDER — ALBUTEROL SULFATE 90 UG/1
1-2 AEROSOL, METERED RESPIRATORY (INHALATION)
Status: CANCELLED
Start: 2024-06-10

## 2024-06-06 RX ORDER — CALCITRIOL 0.5 UG/1
1.5 CAPSULE, LIQUID FILLED ORAL DAILY
Status: DISCONTINUED | OUTPATIENT
Start: 2024-06-07 | End: 2024-06-09 | Stop reason: HOSPADM

## 2024-06-06 RX ORDER — MAGNESIUM SULFATE 1 G/100ML
1 INJECTION INTRAVENOUS ONCE
Status: COMPLETED | OUTPATIENT
Start: 2024-06-06 | End: 2024-06-07

## 2024-06-06 RX ORDER — HYDROXYZINE HYDROCHLORIDE 10 MG/1
10 TABLET, FILM COATED ORAL EVERY 8 HOURS PRN
Status: DISCONTINUED | OUTPATIENT
Start: 2024-06-06 | End: 2024-06-09 | Stop reason: HOSPADM

## 2024-06-06 RX ORDER — CALCIUM CARBONATE 500 MG/1
2000 TABLET, CHEWABLE ORAL 4 TIMES DAILY
Status: DISCONTINUED | OUTPATIENT
Start: 2024-06-06 | End: 2024-06-09 | Stop reason: HOSPADM

## 2024-06-06 RX ADMIN — ACETAMINOPHEN 975 MG: 325 TABLET, FILM COATED ORAL at 21:03

## 2024-06-06 RX ADMIN — ACETAMINOPHEN 975 MG: 325 TABLET, FILM COATED ORAL at 13:28

## 2024-06-06 RX ADMIN — CALCIUM CARBONATE (ANTACID) CHEW TAB 500 MG 2000 MG: 500 CHEW TAB at 13:25

## 2024-06-06 RX ADMIN — CARVEDILOL 12.5 MG: 12.5 TABLET, FILM COATED ORAL at 18:22

## 2024-06-06 RX ADMIN — ONDANSETRON 4 MG: 2 INJECTION INTRAMUSCULAR; INTRAVENOUS at 07:47

## 2024-06-06 RX ADMIN — ACETAMINOPHEN 975 MG: 325 TABLET, FILM COATED ORAL at 01:21

## 2024-06-06 RX ADMIN — ONDANSETRON 4 MG: 4 TABLET, ORALLY DISINTEGRATING ORAL at 23:36

## 2024-06-06 RX ADMIN — AMLODIPINE BESYLATE 10 MG: 10 TABLET ORAL at 13:29

## 2024-06-06 RX ADMIN — POLYETHYLENE GLYCOL 3350 17 G: 17 POWDER, FOR SOLUTION ORAL at 13:28

## 2024-06-06 RX ADMIN — HYDROCODONE BITARTRATE AND ACETAMINOPHEN 1 TABLET: 5; 325 TABLET ORAL at 18:22

## 2024-06-06 RX ADMIN — Medication: at 07:40

## 2024-06-06 RX ADMIN — SEVELAMER CARBONATE 4000 MG: 800 TABLET, FILM COATED ORAL at 18:22

## 2024-06-06 RX ADMIN — DOCUSATE SODIUM 50 MG AND SENNOSIDES 8.6 MG 1 TABLET: 8.6; 5 TABLET, FILM COATED ORAL at 13:28

## 2024-06-06 RX ADMIN — CALCIUM CARBONATE (ANTACID) CHEW TAB 500 MG 2000 MG: 500 CHEW TAB at 16:13

## 2024-06-06 RX ADMIN — SEVELAMER CARBONATE 4000 MG: 800 TABLET, FILM COATED ORAL at 13:26

## 2024-06-06 RX ADMIN — CALCIUM CARBONATE (ANTACID) CHEW TAB 500 MG 2000 MG: 500 CHEW TAB at 19:27

## 2024-06-06 RX ADMIN — CARVEDILOL 12.5 MG: 12.5 TABLET, FILM COATED ORAL at 13:28

## 2024-06-06 RX ADMIN — CALCIUM GLUCONATE 1 G: 20 INJECTION, SOLUTION INTRAVENOUS at 23:37

## 2024-06-06 RX ADMIN — SODIUM CHLORIDE 250 ML: 9 INJECTION, SOLUTION INTRAVENOUS at 07:39

## 2024-06-06 RX ADMIN — SODIUM CHLORIDE 300 ML: 9 INJECTION, SOLUTION INTRAVENOUS at 07:40

## 2024-06-06 RX ADMIN — DULOXETINE HYDROCHLORIDE 20 MG: 20 CAPSULE, DELAYED RELEASE ORAL at 19:27

## 2024-06-06 RX ADMIN — MAGNESIUM SULFATE IN DEXTROSE 1 G: 10 INJECTION, SOLUTION INTRAVENOUS at 23:37

## 2024-06-06 RX ADMIN — CALCIUM GLUCONATE 2 G: 20 INJECTION, SOLUTION INTRAVENOUS at 06:58

## 2024-06-06 ASSESSMENT — ACTIVITIES OF DAILY LIVING (ADL)
ADLS_ACUITY_SCORE: 27
ADLS_ACUITY_SCORE: 26
ADLS_ACUITY_SCORE: 25
ADLS_ACUITY_SCORE: 25
ADLS_ACUITY_SCORE: 27
ADLS_ACUITY_SCORE: 25
ADLS_ACUITY_SCORE: 26
ADLS_ACUITY_SCORE: 27

## 2024-06-06 NOTE — PROGRESS NOTES
Nephrology Progress Note  06/06/2024         Assessment & Recommendations:   Radha Winchester is a 68 year old with history of HTN, HLD, multivessel CAD, DMII, and ESRD on HD (TTS). She has a history of secondary hyperparathyroidism and lytic bone lesions. Evaluated by oncology and ultimately underwent bone biopsy showing brown tumor. Now admitted for 3.5 gland parathyroidectomy which she had on 6/4/24 with Dr. Santana.     #ESRD on HD  Dialysis at [facility] with Dr. Simpson on TTS schedule. Has been on dialysis for nearly 11 years. Run time 3.5 hours via left AVF. 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   - Agree with q6 monitoring of Ca, Phos, Mg  - iCa 3.3, phos 4.9, Mg 2.1  - Would administer IV calcium with 2g of IV calcium gluconate if iCa <4 or total Ca <8. Some cases of persistent hypocalcemia require a continuous infusion of Calcium. This concentration is usually 100 mL of 10% calcium gluconate in 1L of D5, started at a rate of 50mL/hr.  -Mg can be replaced IV if low   - dialyzing on high Ca bath  -Generally phosphorus replacement is avoided unless severe (<1)  - recommend remaining inpatient until pt can go at least 24 hrs without requiring IV Calcium  - upon discharge, recommend 2x/week labs at a location where pt could also get IV Ca if needed (dialysis units do not give IV Ca); would give 2g IV Ca for iCa < 4.0    # Hx of hyperkalemia  -  continue PTA [facility]     # BP/Volume: EDW 63 kg  - pre HD standing weight 67.9 kg  - UF goal 3L    Recommendations were communicated to primary team via this note and MEME Wyman   Division of Renal Disease and Hypertension  [facility]  [employer] Web Console      Interval History :   Seen on dialysis, stable run so far, 3 kg UF goal. No  "symptoms of low Ca. No n/v, cP, SOB, chills, tingling    Physical Exam:   I/O last 3 completed shifts:  In: 140 [P.O.:140]  Out: 3000 [Other:3000]   BP (!) 142/48 (BP Location: Right arm, Cuff Size: Adult Regular)   Pulse 63   Temp 98  F (36.7  C)   Resp 21   Ht 1.626 m (5' 4\")   Wt 67.9 kg (149 lb 11.1 oz)   SpO2 93%   BMI 25.69 kg/m       GENERAL APPEARANCE: NAD  EYES:  no scleral icterus, pupils equal  HENT: mouth without ulcers or lesions  PULM: CTA  CV: RRR     -edema trace in hands   GI: soft, nontender, nondistended  INTEGUMENT: no cyanosis, no rash  NEURO:  no asterixis   Access L AVF    Labs:   All labs reviewed by me  Electrolytes/Renal -   Recent Labs   Lab Test 06/06/24  0553 06/06/24  0051 06/05/24  1823 06/05/24  1803 06/05/24  1112   *  --   --   --   --    POTASSIUM 4.4 4.0 4.2  --   --    CHLORIDE 94*  --   --   --   --    CO2 23  --   --   --   --    BUN 45.0*  --   --   --   --    CR 4.86*  --   --   --   --    *  --   --  155* 151*   PAM 7.5*  7.5* 7.5* 7.8*  --   --    MAG 2.1 2.2 2.0  --   --    PHOS 4.9* 4.6* 4.0  --   --        CBC -   Recent Labs   Lab Test 06/06/24  0753   WBC 7.6   HGB 8.4*          LFTs -   Recent Labs   Lab Test 06/04/24  1107   ALBUMIN 4.2       Iron Panel - No lab results found.      Imaging:  All imaging studies reviewed by me.     Current Medications:  Current Facility-Administered Medications   Medication Dose Route Frequency Provider Last Rate Last Admin    acetaminophen (TYLENOL) tablet 975 mg  975 mg Oral Q8H Liz Rivera MD   975 mg at 06/06/24 1328    amLODIPine (NORVASC) tablet 10 mg  10 mg Oral Daily Zander Gómez MD   10 mg at 06/06/24 1329    [START ON 6/7/2024] calcitRIOL (ROCALTROL) capsule 1.5 mcg  1.5 mcg Oral Daily Kathrine Ross MD        calcium carbonate (TUMS) chewable tablet 2,000 mg  2,000 mg Oral 4x Daily RossKathrine celis MD   2,000 mg at 06/06/24 1613    carvedilol (COREG) tablet 12.5 mg  12.5 mg Oral BID w/meals " Zander Gómez MD   12.5 mg at 06/06/24 1328    DULoxetine (CYMBALTA) DR capsule 20 mg  20 mg Oral QPM Anita Santana MD   20 mg at 06/05/24 1938    polyethylene glycol (MIRALAX) Packet 17 g  17 g Oral Daily Liz Rivera MD   17 g at 06/06/24 1328    senna-docusate (SENOKOT-S/PERICOLACE) 8.6-50 MG per tablet 1 tablet  1 tablet Oral BID Liz Rivera MD   1 tablet at 06/06/24 1328    sevelamer carbonate (RENVELA) tablet 4,000 mg  4,000 mg Oral TID w/meals Anita Santana MD   4,000 mg at 06/06/24 1326    sodium chloride (PF) 0.9% PF flush 3 mL  3 mL Intracatheter Q8H Liz Rivera MD   3 mL at 06/06/24 1613     Current Facility-Administered Medications   Medication Dose Route Frequency Provider Last Rate Last Admin     MEME Amaya  I, Claritza Hart, saw and evaluated this patient as part of a shared visit.  I have reviewed and discussed with the advanced practice provider their history, physical and plan.  I have personally performed the substantive portion of the medical decision making for this visit - please see the [facility]'s documentation for the full details  I personally reviewed the vital signs, medications, labs and imaging.    Key findings and management decisions made by me:  ESRD, s/p parathyroidectomy with hungry bone syndrome. Supporting with calcium and increasing calcitriol to 1.5mg    Claritza Hart  Date of Service (when I saw the patient): 6/6

## 2024-06-06 NOTE — PLAN OF CARE
Goal Outcome Evaluation:      Plan of Care Reviewed With: patient    Overall Patient Progress: improvingOverall Patient Progress: improving    Outcome Evaluation: K+ normalized

## 2024-06-06 NOTE — CONSULTS
Care Management Initial Consult    General Information  Assessment completed with: Patient,    Type of CM/SW Visit: Initial Assessment    Primary Care Provider verified and updated as needed: Yes   Readmission within the last 30 days: no previous admission in last 30 days      Reason for Consult: discharge planning  Advance Care Planning: Advance Care Planning Reviewed: no concerns identified          Communication Assessment  Patient's communication style: spoken language (English or Bilingual)    Hearing Difficulty or Deaf: no   Wear Glasses or Blind: yes    Cognitive  Cognitive/Neuro/Behavioral: WDL                      Living Environment:   People in home: child(ethan), adult  Rachell Barbosa  Current living Arrangements: house      Able to return to prior arrangements: yes       Family/Social Support:  Care provided by: child(ethan)  Provides care for: no one  Marital Status: Single  Children          Description of Support System: Supportive, Involved    Support Assessment: Adequate family and caregiver support, Adequate social supports    Current Resources:   Patient receiving home care services: No     Community Resources: None  Equipment currently used at home: shower chair, walker, rolling  Supplies currently used at home:      Employment/Financial:  Employment Status: retired        Financial Concerns: none   Referral to Financial Worker: No       Does the patient's insurance plan have a 3 day qualifying hospital stay waiver?  No    Lifestyle & Psychosocial Needs:  Social Determinants of Health     Food Insecurity: No Food Insecurity (10/7/2022)    Received from [employer], [facility]    Food Insecurity     Worried About Running Out of Food in the Last Year: 1   Depression: Not at risk (5/23/2024)    Received from [employer]    PHQ-2     PHQ-2 TOTAL SCORE: 0   Housing Stability: Low Risk  (10/7/2022)    Received  from [facility] [facility], [employer] [facility] by [facility]    Housing Stability     Unable to Pay for Housing in the Last Year: 1   Tobacco Use: Medium Risk (5/31/2024)    Patient History     Smoking Tobacco Use: Former     Smokeless Tobacco Use: Never     Passive Exposure: Not on file   Financial Resource Strain: Low Risk  (10/7/2022)    [facility] [employer] [facility], [facility]    Financial Resource Strain     Difficulty of Paying Living Expenses: 3     Difficulty of Paying Living Expenses: Not on file   Alcohol Use: Not on file   Transportation Needs: No Transportation Needs (10/7/2022)    Received from [employer] [facility]placed by [facility], [employer] Replaced by [facility]    Transportation Needs     Lack of Transportation (Medical): 1   Physical Activity: Not on file   Interpersonal Safety: Not on file   Stress: Not on file   Social Connections: Unknown (10/8/2023)    Received from [employer] [facility]placed by [facility], [facility] [facility] by [facility]    Social Connections     Frequency of Communication with Friends and Family: Not on file   Health Literacy: Not on file       Functional Status:  Prior to admission patient needed assistance:              Mental Health Status:  Mental Health Status: No Current Concerns       Chemical Dependency Status:  Chemical Dependency Status: No Current Concerns             Values/Beliefs:  Spiritual, Cultural Beliefs, Islam Practices, Values that affect care:    Description of Beliefs that Will Affect Care: Taoism            Additional Information:    Johanny has therapy plan orders for labs 2 x week for calcium with IV infusion of calcium if needed.    Met with Johanny and discussed plan.   Joahnny would prefer to have the appointments on non dialysis days. The closest [facility] is in [place] but they are booked all the way  through June.  Plan is [place] [facility]  She will need the labs there as well as they will need to have access to the labs to determine if she needs an infusion. Johanny is agreeable to go to the [facility].     Call to [facility] scheduling.   will call back to 7 B RNCC.     Call back from [facility], she is scheduled for Ca and infusion if needed.  M W for several weeks.     Aicha Sapp, RN, BA   6/6/2024  Nurse Coordinator,  [facility]      Social Work and Care Management Department       SEARCHABLE in [facility] - search CARE COORDINATOR       [place] & [place] ([id number]) Saturday & Sunday; ([phone]) [facility] Recognized Holidays     Units: 5A Onc 5201 - 5219 RNCC,  5A Onc 5220 thru 5240 RNCC, 5C OFFSERVICE [phone] RNCC & 5C OFF SERVICE [phone] RNCC       Units: 6B Vocera, 6C Card 6401 thru 6420 RNCC, 6C Card 6502 thru 6514 RNCC & 6C Card 6515 thru 6519 RNCC        Units: 7A SOT RNCC Vocera, 7B Med Surg Vocera, 7C Med Surg 7401 thru 7418 RNCC & 7C Med Surg 7502 thru 7521 RNCC       Units: 6A Vocera & 4A CVICU Vocera, 4C MICU Vocera, and 4E SICU Vocera         Units: 5 Ortho Vocera & 5 Med Surg Vocera        Units: 6 Med Surg Vocera & 8 Med Surg Vocera

## 2024-06-06 NOTE — PLAN OF CARE
Goal Outcome Evaluation:    Temp: 98.1  F (36.7  C) Temp src: Oral BP: 134/60 Pulse: 79   Resp: 16 SpO2: 100 % O2 Device: None (Room air)       Neuro: Alert and Oriented  x4, let needs known  Cardiac: WNL, denies cardiac chest pain  Respiratory:LS clear and dim on room air   GI/: Anuric on HD, last BM 6-6; passing gas  Diet/Appetite: regular, good intake  Skin: changing position independently; neck incision approximated w/ dermabond  LDA: x1 PIV SL; L AV fistula  Activity: Up IND  Pain: denies  Plan: dialysis 6-8

## 2024-06-06 NOTE — PROGRESS NOTES
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 Santana. Post-op was found to have decreased calcium and elevated potassium concerning for bone hunger syndrome. Medicine team was consulted to take over as primary team.     Today:   - space to q8h Ca, Mg, Phos checks  - replace electrolytes as needed per nephrology recommendations  - Would administer IV calcium with 1-2g of IV calcium gluconate if iCa <4 or total Ca <8. Some cases of persistent hypocalcemia require a continuous infusion of Calcium. This concentration is usually 100 mL of 10% calcium gluconate in 1L of D5, started at a rate of 50mL/hr.  - Mg can be replaced IV if low   - Generally phosphorus replacement is avoided unless severe (<1)  - HD today with high calcium bath  - Placed outpatient infusion referral  - continue inpatient management of hypocalcemia until >24 hours of not requiring IV calcium     # ESRD on HD  # Secondary hyperparathyroidism s/p parathyroidectomy 6/5  # Brown's tumor  # Bone hunger syndrome  # Hx of hyperkalemia  Underwent parathyroidectomy 6/5 and had intra-operative drop of PTH from 1190 to 145. Subsequently noted to have low calcium and elevated potassium concerning for bone hunger syndrome. Patient was started on calcitriol and calcium carbonate. Nephrology consulted for assistance in management. On POD 1 had K of 6.2 and Ca of 7.2, received calcium gluconate, dextrose, and insulin. Patient also has history of hyperkalemia and was on lokelma PTA. Continues to require IV calcium replacement.    - Nephrology consulted              - HD today with high calcium bath              - Space to q8h monitoring of Ca, Phos, Mg  - Would administer IV calcium with 1-2g of IV calcium gluconate if iCa <4 or total Ca <8. Some cases of persistent hypocalcemia require a continuous infusion of Calcium. This concentration is usually 100 mL of 10% calcium gluconate in 1L of D5, started at a rate of 50mL/hr.  - Mg can be replaced IV if low   - Generally phosphorus replacement is avoided unless severe (<1)  - Continue lokelma   - Increased calcitriol to 1.5 mg daily  - Increased calcium carbonate to 2000 mg QID   - Continue inpatient management until >24 hours without requiring IV calcium  - Sevelamer carbonate 4000 mg TID  - EKG if any symptoms of chest pain or palpitations  - Discussed with care coordination to arrange care at outpatient infusion center    - upon discharge, recommend 2x/week labs at a location where pt could also get IV Ca if needed (dialysis units do not give IV Ca); would give 2g IV Ca for iCa < 4.0      # Type II Diabetes  Hgb A1c 5.1. Not currently on any medications PTA.      # Hypertension  PTA amlodipine     # Hyperlipidemia   PTA ezetimibe     # CAD s/p CABG 2022  # Combined systolic and diastolic congestive heart failure  Most recent echo 11/29/22 with EF 55-60%. PTA medications include aspirin, carvedilol, amlodipine, nitroglycerin prn, and rosuvastatin.   - Hold aspirin in the setting of recent parathyroidectomy, can restart outpatient     # Generalized anxiety disorder  Continue PTA duloxetine, hydroxyzine.   - Reports anxiety symptoms well controlled           Diet: Regular Diet Adult    DVT Prophylaxis: Low Risk/Ambulatory with no VTE prophylaxis indicated  Cardoso Catheter: Not present  Fluids: none   Lines: None     Cardiac Monitoring: None  Code Status: Full Code      Clinically Significant Risk Factors        # Hyperkalemia: Highest K = 6.2 mmol/L in last 2 days, will monitor as appropriate    # Hypocalcemia: Lowest iCa = 3.3 mg/dL in last 2 days, will monitor and replace as appropriate                                 Disposition Plan      Expected Discharge Date: 06/06/2024,  3:00 PM    Destination: home with family          The patient's care was discussed with the Attending Physician, Dr. Ross .    Dariela Hopkins, MS4  Medical Student  [facility]    I was present with the medical student who participated in the service and in the documentation of the note. I have verified the history and personally performed the physical exam and medical decision making. I agree with the assessment and plan of care as documented in the note.    Mark Ann MD  Internal Medicine PGY2      ______________________________________________________________________    Interval History   No acute overnight events, continued calcium monitoring and replacement. Johanny reports feeling well today. No bone pain or muscle cramps. Eating well. Pain well controlled.     Physical Exam   Vital Signs: Temp: 97.9  F (36.6  C) Temp src: Oral BP: (!) 151/50 Pulse: 79   Resp: 16 SpO2: 100 % O2 Device: None (Room air)    Weight: 141 lbs 8.57 oz    General Appearance: Awake and alert, no acute distress.   Respiratory: Breathing comfortably on room air. Lungs clear to auscultation bilaterally. Good air movement.   Cardiovascular: Regular rate and rhythm. No murmur.   Skin: Surgical incision over lower neck with minimal surrounding erythema, surrounding skin dry, incision closed with     Medical Decision Making             Data

## 2024-06-06 NOTE — PLAN OF CARE
Goal Outcome Evaluation:    Vitals:    06/05/24 1104 06/05/24 1300 06/05/24 1414 06/05/24 1946   BP: (!) 160/57 (!) 164/62 128/48 123/47   BP Location: Right arm Right arm Right arm Right arm   Patient Position: Semi-Phelps's Semi-Phelps's     Cuff Size: Adult Regular Adult Regular     Pulse:   68 70   Resp: 20  20 18   Temp: 97.6  F (36.4  C)   98.5  F (36.9  C)   TempSrc: Oral   Oral   SpO2:   93% 96%   Weight:       Height:        Alert and oriented, Oxy for pain. Tolerating intake, left arm fistula positive for bruit and thrill,  Had dialysis today, took out 3 lit, resting comfortable, continue with plan of care.

## 2024-06-06 NOTE — PLAN OF CARE
"/50 (BP Location: Right arm)   Pulse 71   Temp 98.1  F (36.7  C) (Oral)   Resp 17   Ht 1.626 m (5' 4\")   Wt 67.9 kg (149 lb 11.1 oz)   SpO2 97%   BMI 25.69 kg/m      VSS, L AV fistula with positive bruitt and thrill, up independently, neck incision is dermabonded and CELIO, regular diet, R PIV x 2 saline locked, plan to go to dialysis today, continue with plan of care                        "

## 2024-06-06 NOTE — PROGRESS NOTES
HEMODIALYSIS TREATMENT NOTE    Date: 6/6/2024  Time: 11:41 AM    Data:  Pre Wt:97.2kg   Desired Wt: 94.2kg   Post Wt:94.2kg  Weight change:3.0kg  Ultrafiltration - Post Run Net Total Removed (mL): 3000 mL  Vascular Access Status: patent  Dialyzer Rinse: Streaked  Total Blood Volume Processed: 89.95 L Liters  Total Dialysis (Treatment) Time: 3.5 Hours  K3Ca3     AVF 15g x 2 successfully     Lab:   Yes  Interventions:  Green top obtained. Pt tolerated tx 3.5Hr and 3.0L UF removal w/o complications. B/t+. Cannulated w 15g x 2 successfully. Hemostasis obtained within 10 minutes. Bandages changed post tx. No medications administered r/t iHD. Pt received 4mg IVP Zofran per c/o nausea related to calcium gluconate infusion.     Assessment:  A&Ox4. Hr-RRR. 20rpm. Lung sounds diminished ant & post BUL/BLL. Edema present in ble. Pt denies complains since last tx r/t iHD. B/t+. Hemostasis obtained within 10 minutes post tx     Plan:    Per renal and pcn

## 2024-06-06 NOTE — PLAN OF CARE
Problem: Adult Inpatient Plan of Care  Goal: Plan of Care Review  Description: The Plan of Care Review/Shift note should be completed every shift.  The Outcome Evaluation is a brief statement about your assessment that the patient is improving, declining, or no change.  This information will be displayed automatically on your shift  note.  Flowsheets (Taken 6/6/2024 1863)  Outcome Evaluation: Discharge home  Plan of Care Reviewed With: patient

## 2024-06-07 LAB
ANION GAP SERPL CALCULATED.3IONS-SCNC: 17 MMOL/L (ref 7–15)
BUN SERPL-MCNC: 40 MG/DL (ref 8–23)
CALCIUM SERPL-MCNC: 8 MG/DL (ref 8.8–10.2)
CALCIUM SERPL-MCNC: 8.4 MG/DL (ref 8.8–10.2)
CHLORIDE SERPL-SCNC: 98 MMOL/L (ref 98–107)
CREAT SERPL-MCNC: 3.8 MG/DL (ref 0.51–0.95)
DEPRECATED HCO3 PLAS-SCNC: 24 MMOL/L (ref 22–29)
EGFRCR SERPLBLD CKD-EPI 2021: 12 ML/MIN/1.73M2
GLUCOSE SERPL-MCNC: 111 MG/DL (ref 70–99)
MAGNESIUM SERPL-MCNC: 2.2 MG/DL (ref 1.7–2.3)
PHOSPHATE SERPL-MCNC: 2.9 MG/DL (ref 2.5–4.5)
PHOSPHATE SERPL-MCNC: 3 MG/DL (ref 2.5–4.5)
PHOSPHATE SERPL-MCNC: 3.2 MG/DL (ref 2.5–4.5)
POTASSIUM SERPL-SCNC: 4.2 MMOL/L (ref 3.4–5.3)
PTH-INTACT SERPL-MCNC: 52 PG/ML (ref 15–65)
SODIUM SERPL-SCNC: 139 MMOL/L (ref 135–145)

## 2024-06-07 PROCEDURE — 250N000013 HC RX MED GY IP 250 OP 250 PS 637: Performed by: STUDENT IN AN ORGANIZED HEALTH CARE EDUCATION/TRAINING PROGRAM

## 2024-06-07 PROCEDURE — 36415 COLL VENOUS BLD VENIPUNCTURE: CPT

## 2024-06-07 PROCEDURE — 99232 SBSQ HOSP IP/OBS MODERATE 35: CPT | Mod: GC | Performed by: STUDENT IN AN ORGANIZED HEALTH CARE EDUCATION/TRAINING PROGRAM

## 2024-06-07 PROCEDURE — 250N000013 HC RX MED GY IP 250 OP 250 PS 637

## 2024-06-07 PROCEDURE — 83735 ASSAY OF MAGNESIUM: CPT

## 2024-06-07 PROCEDURE — 83970 ASSAY OF PARATHORMONE: CPT | Performed by: STUDENT IN AN ORGANIZED HEALTH CARE EDUCATION/TRAINING PROGRAM

## 2024-06-07 PROCEDURE — 82310 ASSAY OF CALCIUM: CPT

## 2024-06-07 PROCEDURE — 99233 SBSQ HOSP IP/OBS HIGH 50: CPT | Performed by: PHYSICIAN ASSISTANT

## 2024-06-07 PROCEDURE — 80048 BASIC METABOLIC PNL TOTAL CA: CPT

## 2024-06-07 PROCEDURE — 84100 ASSAY OF PHOSPHORUS: CPT

## 2024-06-07 PROCEDURE — 999N000248 HC STATISTIC IV INSERT WITH US BY RN

## 2024-06-07 PROCEDURE — 36415 COLL VENOUS BLD VENIPUNCTURE: CPT | Performed by: STUDENT IN AN ORGANIZED HEALTH CARE EDUCATION/TRAINING PROGRAM

## 2024-06-07 PROCEDURE — 120N000002 HC R&B MED SURG/OB UMMC

## 2024-06-07 PROCEDURE — 250N000013 HC RX MED GY IP 250 OP 250 PS 637: Performed by: SURGERY

## 2024-06-07 RX ADMIN — SEVELAMER CARBONATE 4000 MG: 800 TABLET, FILM COATED ORAL at 13:18

## 2024-06-07 RX ADMIN — SEVELAMER CARBONATE 4000 MG: 800 TABLET, FILM COATED ORAL at 09:16

## 2024-06-07 RX ADMIN — CARVEDILOL 12.5 MG: 12.5 TABLET, FILM COATED ORAL at 09:16

## 2024-06-07 RX ADMIN — DULOXETINE HYDROCHLORIDE 20 MG: 20 CAPSULE, DELAYED RELEASE ORAL at 20:33

## 2024-06-07 RX ADMIN — CALCIUM CARBONATE (ANTACID) CHEW TAB 500 MG 2000 MG: 500 CHEW TAB at 20:33

## 2024-06-07 RX ADMIN — CALCIUM CARBONATE (ANTACID) CHEW TAB 500 MG 2000 MG: 500 CHEW TAB at 13:17

## 2024-06-07 RX ADMIN — ACETAMINOPHEN 975 MG: 325 TABLET, FILM COATED ORAL at 05:49

## 2024-06-07 RX ADMIN — CARVEDILOL 12.5 MG: 12.5 TABLET, FILM COATED ORAL at 17:25

## 2024-06-07 RX ADMIN — CALCIUM CARBONATE (ANTACID) CHEW TAB 500 MG 2000 MG: 500 CHEW TAB at 16:22

## 2024-06-07 RX ADMIN — HYDROCODONE BITARTRATE AND ACETAMINOPHEN 1 TABLET: 5; 325 TABLET ORAL at 20:54

## 2024-06-07 RX ADMIN — DOCUSATE SODIUM 50 MG AND SENNOSIDES 8.6 MG 1 TABLET: 8.6; 5 TABLET, FILM COATED ORAL at 20:33

## 2024-06-07 RX ADMIN — AMLODIPINE BESYLATE 10 MG: 10 TABLET ORAL at 09:16

## 2024-06-07 RX ADMIN — ACETAMINOPHEN 650 MG: 325 TABLET, FILM COATED ORAL at 13:17

## 2024-06-07 RX ADMIN — DOCUSATE SODIUM 50 MG AND SENNOSIDES 8.6 MG 1 TABLET: 8.6; 5 TABLET, FILM COATED ORAL at 09:16

## 2024-06-07 RX ADMIN — SEVELAMER CARBONATE 4000 MG: 800 TABLET, FILM COATED ORAL at 17:25

## 2024-06-07 RX ADMIN — CALCITRIOL 1.5 MCG: 0.5 CAPSULE ORAL at 09:16

## 2024-06-07 RX ADMIN — CALCIUM CARBONATE (ANTACID) CHEW TAB 500 MG 2000 MG: 500 CHEW TAB at 10:10

## 2024-06-07 ASSESSMENT — ACTIVITIES OF DAILY LIVING (ADL)
ADLS_ACUITY_SCORE: 24

## 2024-06-07 NOTE — PLAN OF CARE
Goal Outcome Evaluation:      Plan of Care Reviewed With: patient    Overall Patient Progress: improving       Status: POD #2 parathyroidectomy   Pain/Nausea: Gave PRN norco x1. Denies nausea   Mobility: Up ad lana  Diet: Regular  Labs: Ca 8.7  LDAs: R PIV-SL, L AV fistula  Skin/incisions: Neck incision- glued and CELIO  Neuro: A&Ox4  Respiratory: RA. LS clear  Cardiac: X HTN within parameters  GI/: Anuric on HD. 2 BM today, +flatus

## 2024-06-07 NOTE — PROGRESS NOTES
Nephrology Progress Note  06/07/2024         Assessment & Recommendations:   Radha Winchester is a 68 year old with history of HTN, HLD, multivessel CAD, DMII, and ESRD on HD (TTS). She has a history of secondary hyperparathyroidism and lytic bone lesions. Evaluated by oncology and ultimately underwent bone biopsy showing brown tumor. Now admitted for 3.5 gland parathyroidectomy which she had on 6/4/24 with Dr. Santana.     #ESRD on HD  Dialysis at [facility] with Dr. Simpson on TTS schedule. Has been on dialysis for nearly 11 years. Run time 3.5 hours via left AVF. Target weight 63kg. Pt is not interested in a transplant   -HD per TTS schedule     #S/p 3.5 gland parathyroidectomy with hungry bone syndrome  Intra-op PTH 1990, decreased to 57 post op  - continue calcitriol to 1.5 mg calcitriol daily   - continue tums to 2000mg QID.   - Agree with q6 monitoring of Ca, Phos, Mg  - iCa 3.3, phos 3.2, Mg 2.2  - please hold IV Ca for 24 hrs as long as pt is asymptomatic; monitoring for whether Ca can remain stable even if low stable.  - dialyzing on high Ca bath on dialysis days  -Generally phosphorus replacement is avoided unless severe (<1)  - upon discharge, recommend 2x/week labs at a location where pt could also get IV Ca if needed (dialysis units do not give IV Ca); would give 2g IV Ca for iCa < 4.0    # Hx of hyperkalemia  -  continue PTA lokelma     # BP/Volume: EDW 63 kg  - UF to dry weight tomorrow    # Anemia of CKD: hgb 8.4, on mircera protocol at OP HD unit  - will start 4000 units epo per HD    Recommendations were communicated to primary team via this note and MEME Wyman   Division of Renal Disease and Hypertension  [facility]  [employer] Web Console      Interval History :   Seen bedside, edema has mostly resolved so pt does not feel she needs extra HD run today. Plan for tomorrow on high Ca bath. Discussed with Dr. Hart and team, will try to hold IV Ca for 24 hrs as  "long as pt is asymptomatic, monitor for stability even if low. No current n/v, CP, SOB, chills    Physical Exam:   I/O last 3 completed shifts:  In: 1325 [P.O.:1025; I.V.:300]  Out: 3000 [Other:3000]   /60 (BP Location: Right arm, Patient Position: Sitting, Cuff Size: Adult Regular)   Pulse 79   Temp 98.1  F (36.7  C) (Oral)   Resp 16   Ht 1.626 m (5' 4\")   Wt 64.2 kg (141 lb 8.6 oz)   SpO2 100%   BMI 24.29 kg/m       GENERAL APPEARANCE: NAD  EYES:  no scleral icterus, pupils equal  HENT: mouth without ulcers or lesions  PULM: CTA  CV: RRR     -edema trace in hands   GI: soft, nontender, nondistended  INTEGUMENT: no cyanosis, no rash  NEURO:  no asterixis   Access L AVF    Labs:   All labs reviewed by me  Electrolytes/Renal -   Recent Labs   Lab Test 06/07/24  0836 06/06/24  2205 06/06/24  1355 06/06/24  1214 06/06/24  0553 06/06/24  0051     --   --   --  134*  --    POTASSIUM 4.2  --   --   --  4.4 4.0   CHLORIDE 98  --   --   --  94*  --    CO2 24  --   --   --  23  --    BUN 40.0*  --   --   --  45.0*  --    CR 3.80*  --   --   --  4.86*  --    *  --   --  157* 154*  --    PAM 8.0*  8.0* 7.9* 8.7*  --  7.5*  7.5* 7.5*   MAG 2.2 1.8 1.8  --  2.1 2.2   PHOS 3.2 3.4 2.2*  --  4.9* 4.6*       CBC -   Recent Labs   Lab Test 06/06/24  0753   WBC 7.6   HGB 8.4*          LFTs -   Recent Labs   Lab Test 06/04/24  1107   ALBUMIN 4.2       Iron Panel - No lab results found.      Imaging:  All imaging studies reviewed by me.     Current Medications:  Current Facility-Administered Medications   Medication Dose Route Frequency Provider Last Rate Last Admin    amLODIPine (NORVASC) tablet 10 mg  10 mg Oral Daily Zander Gómez MD   10 mg at 06/07/24 0916    calcitRIOL (ROCALTROL) capsule 1.5 mcg  1.5 mcg Oral Daily Kathrine Ross MD   1.5 mcg at 06/07/24 0916    calcium carbonate (TUMS) chewable tablet 2,000 mg  2,000 mg Oral 4x Daily Kathrine Ross MD   2,000 mg at 06/07/24 1010    carvedilol " (COREG) tablet 12.5 mg  12.5 mg Oral BID w/meals Zander Gómez MD   12.5 mg at 06/07/24 0916    DULoxetine (CYMBALTA) DR capsule 20 mg  20 mg Oral QPM Anita Santana MD   20 mg at 06/06/24 1927    polyethylene glycol (MIRALAX) Packet 17 g  17 g Oral Daily Liz Rivera MD   17 g at 06/06/24 1328    senna-docusate (SENOKOT-S/PERICOLACE) 8.6-50 MG per tablet 1 tablet  1 tablet Oral BID Liz Rivera MD   1 tablet at 06/07/24 0916    sevelamer carbonate (RENVELA) tablet 4,000 mg  4,000 mg Oral TID w/meals Anita Santana MD   4,000 mg at 06/07/24 0916    sodium chloride (PF) 0.9% PF flush 3 mL  3 mL Intracatheter Q8H Liz Rivera MD   3 mL at 06/06/24 1613     Current Facility-Administered Medications   Medication Dose Route Frequency Provider Last Rate Last Admin     MEME Amaya

## 2024-06-07 NOTE — PROGRESS NOTES
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 Santana. Post-op was found to have decreased calcium and elevated potassium concerning for bone hunger syndrome. Medicine team was consulted and subsequently took over as primary team.     Today:   - continue q8h Ca, Mg, Phos checks  - hold off on IV Ca replacement to see if levels stay stable, repleting only if symptomatic  - continue inpatient management for Ca monitoring    # ESRD on HD  # Secondary hyperparathyroidism s/p parathyroidectomy 6/5  # Brown's tumor  # Hungry Bone Syndrome  # Hx of hyperkalemia  Underwent parathyroidectomy 6/5 and had intra-operative drop of PTH from 1190 to 145. Subsequently noted to have low calcium and elevated potassium concerning for bone hunger syndrome. Patient was started on calcitriol and calcium carbonate. Nephrology consulted for assistance in management. On POD 1 had K of 6.2 and Ca of 7.2, received calcium gluconate, dextrose, and insulin. Patient also has history of hyperkalemia and was on lokelma PTA. Continues to be managed inpatient for further monitoring of calcium levels.   - Nephrology consulted              - HD TTS schedule              - Continue q8h monitoring of Ca, Phos, Mg  - Hold off on calcium replacement as long as patient remains asymptomatic to see if levels stabilize, even if low  - Mg can be replaced IV if low   - Generally phosphorus replacement is avoided unless severe (<1)  - Continue lokelma   - Continue calcitriol to 1.5 mg daily  - Continue calcium carbonate to  2000 mg QID   - Sevelamer carbonate 4000 mg TID  - EKG if any symptoms of chest pain or palpitations  - Discussed with care coordination to arrange care at outpatient infusion center    - scheduled for appointments at an infusion center M/W, will be able to stop going to infusion center if > 2 visits without need to IV calcium      # Type II Diabetes  Hgb A1c 5.1. Not currently on any medications PTA.      # Hypertension  PTA amlodipine     # Hyperlipidemia   PTA ezetimibe     # CAD s/p CABG 2022  # Combined systolic and diastolic congestive heart failure  Most recent echo 11/29/22 with EF 55-60%. PTA medications include aspirin, carvedilol, amlodipine, nitroglycerin prn, and rosuvastatin.   - Hold aspirin in the setting of recent parathyroidectomy, can restart outpatient     # Generalized anxiety disorder  Continue PTA duloxetine, hydroxyzine.   - Reports anxiety symptoms well controlled           Diet: Regular Diet Adult    DVT Prophylaxis: Low Risk/Ambulatory with no VTE prophylaxis indicated  Cardoso Catheter: Not present  Fluids: none   Lines: None     Cardiac Monitoring: None  Code Status: Full Code      Clinically Significant Risk Factors          # Hypocalcemia: Lowest iCa = 3.3 mg/dL in last 2 days, will monitor and replace as appropriate                          # Financial/Environmental Concerns: none         Disposition Plan      Expected Discharge Date: 06/08/2024,  3:00 PM    Destination: home with family          The patient's care was discussed with the Attending Physician, Dr. Ross .    Dariela Hopkins, MS4  Medical Student  [facility]    I was present with the medical student who participated in the service and in the documentation of the note. I have verified the history and personally performed the physical exam and medical decision making. I agree with the assessment and plan of care as documented in the note.    Mark Ann MD  Internal Medicine  PGY2    ______________________________________________________________________    Interval History   No acute overnight events. Feels well this morning. No muscle spasms or bone pain. Eating and drinking well. Occasional nausea that resolves with Zofran.     Physical Exam   Vital Signs: Temp: 98.1  F (36.7  C) Temp src: Oral BP: 134/60 Pulse: 79   Resp: 16 SpO2: 100 % O2 Device: None (Room air)    Weight: 141 lbs 8.57 oz    General Appearance: Awake and alert, no acute distress.   Respiratory: Breathing comfortably on room air. Lungs clear to auscultation bilaterally. Good air movement.   Cardiovascular: Regular rate and rhythm. Systolic murmur.   Skin: Surgical incision over lower neck with minimal surrounding erythema, surrounding skin dry, incision closed with dermabond       Data

## 2024-06-07 NOTE — PLAN OF CARE
"/45   Pulse 71   Temp 98.5  F (36.9  C) (Oral)   Resp 16   Ht 1.626 m (5' 4\")   Wt 64.2 kg (141 lb 8.6 oz)   SpO2 97%   BMI 24.29 kg/m      VSS, 0-3/10 pain in back, AV fistula with positive bruit and thrill, up independently, no BM reported, anuric, plan for dialysis today, continue with plan of care                        "

## 2024-06-08 LAB
CALCIUM SERPL-MCNC: 8 MG/DL (ref 8.8–10.2)
CALCIUM SERPL-MCNC: 9 MG/DL (ref 8.8–10.2)
MAGNESIUM SERPL-MCNC: 1.7 MG/DL (ref 1.7–2.3)
MAGNESIUM SERPL-MCNC: 2.1 MG/DL (ref 1.7–2.3)
PHOSPHATE SERPL-MCNC: 1.9 MG/DL (ref 2.5–4.5)
PHOSPHATE SERPL-MCNC: 3.2 MG/DL (ref 2.5–4.5)
PTH-INTACT SERPL-MCNC: 57 PG/ML (ref 15–65)

## 2024-06-08 PROCEDURE — 250N000013 HC RX MED GY IP 250 OP 250 PS 637

## 2024-06-08 PROCEDURE — 90935 HEMODIALYSIS ONE EVALUATION: CPT | Performed by: STUDENT IN AN ORGANIZED HEALTH CARE EDUCATION/TRAINING PROGRAM

## 2024-06-08 PROCEDURE — 99232 SBSQ HOSP IP/OBS MODERATE 35: CPT | Performed by: STUDENT IN AN ORGANIZED HEALTH CARE EDUCATION/TRAINING PROGRAM

## 2024-06-08 PROCEDURE — 36415 COLL VENOUS BLD VENIPUNCTURE: CPT

## 2024-06-08 PROCEDURE — 83970 ASSAY OF PARATHORMONE: CPT | Performed by: STUDENT IN AN ORGANIZED HEALTH CARE EDUCATION/TRAINING PROGRAM

## 2024-06-08 PROCEDURE — 90935 HEMODIALYSIS ONE EVALUATION: CPT

## 2024-06-08 PROCEDURE — 82310 ASSAY OF CALCIUM: CPT

## 2024-06-08 PROCEDURE — 634N000001 HC RX 634: Mod: JZ | Performed by: STUDENT IN AN ORGANIZED HEALTH CARE EDUCATION/TRAINING PROGRAM

## 2024-06-08 PROCEDURE — 83735 ASSAY OF MAGNESIUM: CPT

## 2024-06-08 PROCEDURE — 250N000013 HC RX MED GY IP 250 OP 250 PS 637: Performed by: STUDENT IN AN ORGANIZED HEALTH CARE EDUCATION/TRAINING PROGRAM

## 2024-06-08 PROCEDURE — 250N000013 HC RX MED GY IP 250 OP 250 PS 637: Performed by: SURGERY

## 2024-06-08 PROCEDURE — 84100 ASSAY OF PHOSPHORUS: CPT

## 2024-06-08 PROCEDURE — 258N000003 HC RX IP 258 OP 636: Mod: JZ | Performed by: STUDENT IN AN ORGANIZED HEALTH CARE EDUCATION/TRAINING PROGRAM

## 2024-06-08 PROCEDURE — 120N000002 HC R&B MED SURG/OB UMMC

## 2024-06-08 RX ORDER — CALCITRIOL 0.5 UG/1
1.5 CAPSULE, LIQUID FILLED ORAL DAILY
Qty: 30 CAPSULE | Refills: 0 | Status: SHIPPED | OUTPATIENT
Start: 2024-06-08

## 2024-06-08 RX ORDER — CALCIUM CARBONATE 500 MG/1
2 TABLET, CHEWABLE ORAL 4 TIMES DAILY
Qty: 240 TABLET | Refills: 0 | Status: SHIPPED | OUTPATIENT
Start: 2024-06-08

## 2024-06-08 RX ORDER — ACETAMINOPHEN 325 MG/1
650 TABLET ORAL EVERY 4 HOURS PRN
Qty: 90 TABLET | Refills: 0 | Status: SHIPPED | OUTPATIENT
Start: 2024-06-09

## 2024-06-08 RX ADMIN — CALCITRIOL 1.5 MCG: 0.5 CAPSULE ORAL at 11:40

## 2024-06-08 RX ADMIN — CALCIUM CARBONATE (ANTACID) CHEW TAB 500 MG 2000 MG: 500 CHEW TAB at 16:35

## 2024-06-08 RX ADMIN — CARVEDILOL 12.5 MG: 12.5 TABLET, FILM COATED ORAL at 17:29

## 2024-06-08 RX ADMIN — SODIUM CHLORIDE 300 ML: 9 INJECTION, SOLUTION INTRAVENOUS at 07:41

## 2024-06-08 RX ADMIN — EPOETIN ALFA-EPBX 4000 UNITS: 10000 INJECTION, SOLUTION INTRAVENOUS; SUBCUTANEOUS at 09:19

## 2024-06-08 RX ADMIN — CARVEDILOL 12.5 MG: 12.5 TABLET, FILM COATED ORAL at 11:41

## 2024-06-08 RX ADMIN — SODIUM CHLORIDE 250 ML: 9 INJECTION, SOLUTION INTRAVENOUS at 07:41

## 2024-06-08 RX ADMIN — HYDROCODONE BITARTRATE AND ACETAMINOPHEN 1 TABLET: 5; 325 TABLET ORAL at 19:53

## 2024-06-08 RX ADMIN — ACETAMINOPHEN 650 MG: 325 TABLET, FILM COATED ORAL at 11:59

## 2024-06-08 RX ADMIN — DULOXETINE HYDROCHLORIDE 20 MG: 20 CAPSULE, DELAYED RELEASE ORAL at 19:40

## 2024-06-08 RX ADMIN — DOCUSATE SODIUM 50 MG AND SENNOSIDES 8.6 MG 1 TABLET: 8.6; 5 TABLET, FILM COATED ORAL at 11:40

## 2024-06-08 RX ADMIN — CALCIUM CARBONATE (ANTACID) CHEW TAB 500 MG 2000 MG: 500 CHEW TAB at 11:40

## 2024-06-08 RX ADMIN — CALCIUM CARBONATE (ANTACID) CHEW TAB 500 MG 2000 MG: 500 CHEW TAB at 08:00

## 2024-06-08 RX ADMIN — SEVELAMER CARBONATE 4000 MG: 800 TABLET, FILM COATED ORAL at 17:28

## 2024-06-08 RX ADMIN — SEVELAMER CARBONATE 4000 MG: 800 TABLET, FILM COATED ORAL at 11:48

## 2024-06-08 RX ADMIN — AMLODIPINE BESYLATE 10 MG: 10 TABLET ORAL at 11:41

## 2024-06-08 RX ADMIN — CALCIUM CARBONATE (ANTACID) CHEW TAB 500 MG 2000 MG: 500 CHEW TAB at 19:38

## 2024-06-08 RX ADMIN — DOCUSATE SODIUM 50 MG AND SENNOSIDES 8.6 MG 1 TABLET: 8.6; 5 TABLET, FILM COATED ORAL at 19:38

## 2024-06-08 RX ADMIN — Medication: at 07:41

## 2024-06-08 ASSESSMENT — ACTIVITIES OF DAILY LIVING (ADL)
ADLS_ACUITY_SCORE: 24
ADLS_ACUITY_SCORE: 24
ADLS_ACUITY_SCORE: 28
ADLS_ACUITY_SCORE: 24
ADLS_ACUITY_SCORE: 28
ADLS_ACUITY_SCORE: 24
ADLS_ACUITY_SCORE: 28
ADLS_ACUITY_SCORE: 28
ADLS_ACUITY_SCORE: 24
ADLS_ACUITY_SCORE: 24
ADLS_ACUITY_SCORE: 28
ADLS_ACUITY_SCORE: 24
ADLS_ACUITY_SCORE: 28
ADLS_ACUITY_SCORE: 28
ADLS_ACUITY_SCORE: 24
ADLS_ACUITY_SCORE: 28

## 2024-06-08 NOTE — PLAN OF CARE
"Goal Outcome Evaluation:      Plan of Care Reviewed With: patient    Overall Patient Progress: no change    Outcome Evaluation: No acute changes this shift; assumed care from [id number]; VSS, remains alert and oriented, denies pain.      /44 (BP Location: Right arm)   Pulse 68   Temp 98.1  F (36.7  C) (Oral)   Resp 20   Ht 1.626 m (5' 4\")   Wt 66.6 kg (146 lb 13.2 oz)   SpO2 98%   BMI 25.20 kg/m      "

## 2024-06-08 NOTE — PLAN OF CARE
Patient was in dialysis at shift change. This writer did not assess patient before leaving for dialysis.

## 2024-06-08 NOTE — PROGRESS NOTES
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 Santana. Post-op was found to have decreased calcium and elevated potassium concerning for bone hunger syndrome. Medicine team was consulted and subsequently took over as primary team.     Today:   - Stable ca today, will plan to recheck in AM prior to discharge   - hold off on IV Ca replacement to see if levels stay stable, repleting only if symptomatic  - Plan for discharge 6/9 AM  - HD today    # ESRD on HD  # Secondary hyperparathyroidism s/p parathyroidectomy 6/5  # Brown's tumor  # Hungry Bone Syndrome  # Hx of hyperkalemia  Underwent parathyroidectomy 6/5 and had intra-operative drop of PTH from 1190 to 145. Subsequently noted to have low calcium and elevated potassium concerning for bone hunger syndrome. Patient was started on calcitriol and calcium carbonate. Nephrology consulted for assistance in management. On POD 1 had K of 6.2 and Ca of 7.2, received calcium gluconate, dextrose, and insulin. Patient also has history of hyperkalemia and was on lokelma PTA. Continues to be managed inpatient for further monitoring of calcium levels.   - Nephrology consulted              - HD TTS schedule              - Continue monitoring of Ca, Phos, Mg  - Hold off on calcium replacement as long as patient remains asymptomatic to see if levels stabilize, even if low  - Mg can be replaced IV if low   - Generally phosphorus replacement is avoided unless severe (<1)  - Continue lokelma   - Continue calcitriol to 1.5 mg daily  - Continue  "calcium carbonate to 2000 mg QID   - Sevelamer carbonate 4000 mg TID  - EKG if any symptoms of chest pain or palpitations  - Discussed with care coordination to arrange care at outpatient infusion center    - scheduled for appointments at an infusion center M/W, will be able to stop going to infusion center if > 2 visits without need to IV calcium      # Type II Diabetes  Hgb A1c 5.1. Not currently on any medications PTA.      # Hypertension  PTA amlodipine     # Hyperlipidemia   PTA ezetimibe     # CAD s/p CABG 2022  # Combined systolic and diastolic congestive heart failure  Most recent echo 11/29/22 with EF 55-60%. PTA medications include aspirin, carvedilol, amlodipine, nitroglycerin prn, and rosuvastatin.   - Hold aspirin in the setting of recent parathyroidectomy, can restart outpatient     # Generalized anxiety disorder  Continue PTA duloxetine, hydroxyzine.   - Reports anxiety symptoms well controlled           Diet: Regular Diet Adult    DVT Prophylaxis: Low Risk/Ambulatory with no VTE prophylaxis indicated  Cardoso Catheter: Not present  Fluids: none   Lines: None     Cardiac Monitoring: None  Code Status: Full Code      Clinically Significant Risk Factors                               # Overweight: Estimated body mass index is 25.32 kg/m  as calculated from the following:    Height as of this encounter: 1.626 m (5' 4\").    Weight as of this encounter: 66.9 kg (147 lb 7.8 oz).      # Financial/Environmental Concerns: none         Disposition Plan     Expected Discharge Date: 06/08/2024,  3:00 PM    Destination: home with family          The patient's care was discussed with the Bedside Nurse and Patient.    Kathrine Ross MD  Internal Medicine Hospitalist  [facility] 5    ______________________________________________________________________    Interval History   No acute overnight events. Feels well this morning. Joint pain at baseline. No cramping. Tolerating HD well.     Physical Exam   Vital Signs: Temp: 97.9 "  F (36.6  C) Temp src: Oral BP: (!) 150/52 Pulse: 70   Resp: 20 SpO2: 100 % O2 Device: None (Room air)    Weight: 147 lbs 7.8 oz    General Appearance: Awake and alert, no acute distress.   Respiratory: Breathing comfortably on room air. Lungs clear to auscultation bilaterally. Good air movement.   Cardiovascular: Regular rate and rhythm. Systolic murmur.   Resp: non labored  Skin: Surgical incision over lower neck with minimal surrounding erythema, surrounding skin dry, incision closed with dermabond     Medical Decision Making       Please see A&P for additional details of medical decision making.

## 2024-06-08 NOTE — PROGRESS NOTES
Dialysis Discharge Summary Brief    [facility]  Division of Nephrology  Nephrology Discharge Dialysis Orders  Ph: [phone]  Fax: [phone]    Radha Winchester  MRN: [id number]  YOB: 1955    [facility]: [facility]  Primary Nephrologist: Dr. Simpson    Date of Admission: 6/4/24  Date of Discharge: expected 6/9/24  Discharge Diagnosis:    ICD-10-CM    1. Hungry bone syndrome  E83.81       2. Hyperparathyroidism (H24)  E21.3           [X] Resume all previous dialysis orders with exception as noted below    New Orders:  For patient's hungry bone syndrome, increase calcium bath to 3.0 (we used K3Ca3 bath while inpatient).    Patient started on Tums 2,000 mg QID for calcium supplementation.     We continued calcitriol 1.5 mg daily.     Plan for Monday and Wednesday calcium lab checks at local infusion center (where IV calcium can be given). Serum total calcium was stable at 8.0 though 6/8 off of IV calcium (on oral calcium carbonate/Tums).    Name of physician completing this form: Harvey Bella MD

## 2024-06-08 NOTE — PROGRESS NOTES
Nephrology Dialysis Note    This patient was seen and examined while on dialysis. Laboratory results and nurses' notes were reviewed.    No changes to management of volume, anemia, BMD, acidosis, or electrolytes except as follows.     Diagnosis - ESRD on HD    Plan - Patient informs me she will discharge tomorrow AM. Next HD thus outpatient. Of note, her serum calcium remains stable and acceptable (total calcium 8.0) with last IV calcium, per the MAR, being on 6/6. Outpatient plan to follow-up serum calcium levels and replete IV (in addition to Tums PO QID) are in place.     Mo Bella MD  Nephrology  [phone]

## 2024-06-08 NOTE — PLAN OF CARE
Goal Outcome Evaluation:      Plan of Care Reviewed With: patient    Overall Patient Progress: no change    Neuro:A/OX4  Respiratory:WDL no SOB  Cardiac:WDL. Denies chest pain  Diet:reg  GI/:anuria.  Incision/Drains: none   IV Access:R PIV SL  Pain: managed with PRN [place]  Labs:reviewed  VS:Temp: 97.8  F (36.6  C) Temp src: Oral BP: (!) 148/47 Pulse: 69   Resp: 18 SpO2: 99 % O2 Device: None (Room air)     Activity:ind      New Changes this shift:none   Plan: continue POC

## 2024-06-08 NOTE — PROGRESS NOTES
HEMODIALYSIS TREATMENT NOTE    Date: 6/8/2024  Time: 2:37 PM    Data:  Pre Wt: 66.9kg  Desired Wt:  63.9kg   Post Wt:  63.8kg  Weight change: 3.1kg  Ultrafiltration - Post Run Net Total Removed (mL): 3000mL  Vascular Access Status: patent  Dialyzer Rinse: Streaked  Total Blood Volume Processed: 78.0 L Liters  Total Dialysis (Treatment) Time: 3.5 Hours  K3Ca3     AVF used LUE  Lab:   No    Interventions:  No labs obtained. Pt tolerated tx 3.5 hr and 3.0L UF removal. Pt cannulated w 15g x 2 successfully. Hemostasis obtained within 10 minutes. Bandages changed post tx. K3Ca3 per protocol based off labs. B/t+. Epo administered during tx. Pt turned and repositioned w assistance. Fresh linens provided. Pt c/o slight cramping in last 10 minutes of tx.     Assessment:  A&Ox.3. Hr-RRR.20rpm. Lung sounds diminished ant & post BUL/bLL. Trace edema present in ble patient denies complaints of pain. B/t+.      Plan:    Per renal and pcn

## 2024-06-08 NOTE — PLAN OF CARE
"Goal Outcome Evaluation:      Plan of Care Reviewed With: patient    Overall Patient Progress: improving    Outcome Evaluation: No acute changes this shift; patient remains alert and oriented, VSS, RA, c/o generalized pain - relief with tylenol x1. Dialysis completed - reported 3L off to this RN. Plan for discharge tomorrow per MD provider. Last [facility] lab - stable at 9.0    BP (!) 141/66 (BP Location: Right arm)   Pulse 68   Temp 98.2  F (36.8  C) (Oral)   Resp 18   Ht 1.626 m (5' 4\")   Wt 66.9 kg (147 lb 7.8 oz)   SpO2 97%   BMI 25.32 kg/m            "

## 2024-06-09 VITALS
RESPIRATION RATE: 16 BRPM | HEIGHT: 64 IN | SYSTOLIC BLOOD PRESSURE: 148 MMHG | BODY MASS INDEX: 25.18 KG/M2 | OXYGEN SATURATION: 97 % | DIASTOLIC BLOOD PRESSURE: 58 MMHG | WEIGHT: 147.49 LBS | HEART RATE: 70 BPM | TEMPERATURE: 98.2 F

## 2024-06-09 LAB
ANION GAP SERPL CALCULATED.3IONS-SCNC: 14 MMOL/L (ref 7–15)
BUN SERPL-MCNC: 33.7 MG/DL (ref 8–23)
CALCIUM SERPL-MCNC: 8.7 MG/DL (ref 8.8–10.2)
CHLORIDE SERPL-SCNC: 98 MMOL/L (ref 98–107)
CREAT SERPL-MCNC: 3.67 MG/DL (ref 0.51–0.95)
DEPRECATED HCO3 PLAS-SCNC: 24 MMOL/L (ref 22–29)
EGFRCR SERPLBLD CKD-EPI 2021: 13 ML/MIN/1.73M2
GLUCOSE SERPL-MCNC: 139 MG/DL (ref 70–99)
MAGNESIUM SERPL-MCNC: 1.9 MG/DL (ref 1.7–2.3)
PHOSPHATE SERPL-MCNC: 2.7 MG/DL (ref 2.5–4.5)
POTASSIUM SERPL-SCNC: 4.6 MMOL/L (ref 3.4–5.3)
PTH-INTACT SERPL-MCNC: 47 PG/ML (ref 15–65)
SODIUM SERPL-SCNC: 136 MMOL/L (ref 135–145)

## 2024-06-09 PROCEDURE — 250N000013 HC RX MED GY IP 250 OP 250 PS 637: Performed by: STUDENT IN AN ORGANIZED HEALTH CARE EDUCATION/TRAINING PROGRAM

## 2024-06-09 PROCEDURE — 250N000013 HC RX MED GY IP 250 OP 250 PS 637: Performed by: SURGERY

## 2024-06-09 PROCEDURE — 36415 COLL VENOUS BLD VENIPUNCTURE: CPT | Performed by: STUDENT IN AN ORGANIZED HEALTH CARE EDUCATION/TRAINING PROGRAM

## 2024-06-09 PROCEDURE — 80048 BASIC METABOLIC PNL TOTAL CA: CPT | Performed by: STUDENT IN AN ORGANIZED HEALTH CARE EDUCATION/TRAINING PROGRAM

## 2024-06-09 PROCEDURE — 99239 HOSP IP/OBS DSCHRG MGMT >30: CPT | Performed by: STUDENT IN AN ORGANIZED HEALTH CARE EDUCATION/TRAINING PROGRAM

## 2024-06-09 PROCEDURE — 83970 ASSAY OF PARATHORMONE: CPT | Performed by: STUDENT IN AN ORGANIZED HEALTH CARE EDUCATION/TRAINING PROGRAM

## 2024-06-09 PROCEDURE — 83735 ASSAY OF MAGNESIUM: CPT | Performed by: STUDENT IN AN ORGANIZED HEALTH CARE EDUCATION/TRAINING PROGRAM

## 2024-06-09 PROCEDURE — 250N000013 HC RX MED GY IP 250 OP 250 PS 637

## 2024-06-09 PROCEDURE — 84100 ASSAY OF PHOSPHORUS: CPT | Performed by: STUDENT IN AN ORGANIZED HEALTH CARE EDUCATION/TRAINING PROGRAM

## 2024-06-09 RX ORDER — DIPHENHYDRAMINE HYDROCHLORIDE 50 MG/ML
50 INJECTION INTRAMUSCULAR; INTRAVENOUS
Status: CANCELLED
Start: 2024-06-10

## 2024-06-09 RX ORDER — CALCIUM GLUCONATE 20 MG/ML
2 INJECTION, SOLUTION INTRAVENOUS
Status: CANCELLED
Start: 2024-06-10

## 2024-06-09 RX ORDER — ALBUTEROL SULFATE 0.83 MG/ML
2.5 SOLUTION RESPIRATORY (INHALATION)
Status: CANCELLED | OUTPATIENT
Start: 2024-06-10

## 2024-06-09 RX ORDER — MEPERIDINE HYDROCHLORIDE 25 MG/ML
25 INJECTION INTRAMUSCULAR; INTRAVENOUS; SUBCUTANEOUS EVERY 30 MIN PRN
Status: CANCELLED | OUTPATIENT
Start: 2024-06-10

## 2024-06-09 RX ORDER — EPINEPHRINE 1 MG/ML
0.3 INJECTION, SOLUTION, CONCENTRATE INTRAVENOUS EVERY 5 MIN PRN
Status: CANCELLED | OUTPATIENT
Start: 2024-06-10

## 2024-06-09 RX ORDER — ALBUTEROL SULFATE 90 UG/1
1-2 AEROSOL, METERED RESPIRATORY (INHALATION)
Status: CANCELLED
Start: 2024-06-10

## 2024-06-09 RX ORDER — METHYLPREDNISOLONE SODIUM SUCCINATE 125 MG/2ML
125 INJECTION, POWDER, LYOPHILIZED, FOR SOLUTION INTRAMUSCULAR; INTRAVENOUS
Status: CANCELLED
Start: 2024-06-10

## 2024-06-09 RX ORDER — HEPARIN SODIUM,PORCINE 10 UNIT/ML
5-20 VIAL (ML) INTRAVENOUS DAILY PRN
Status: CANCELLED | OUTPATIENT
Start: 2024-06-10

## 2024-06-09 RX ORDER — HEPARIN SODIUM (PORCINE) LOCK FLUSH IV SOLN 100 UNIT/ML 100 UNIT/ML
5 SOLUTION INTRAVENOUS
Status: CANCELLED | OUTPATIENT
Start: 2024-06-10

## 2024-06-09 RX ADMIN — DOCUSATE SODIUM 50 MG AND SENNOSIDES 8.6 MG 1 TABLET: 8.6; 5 TABLET, FILM COATED ORAL at 07:39

## 2024-06-09 RX ADMIN — SEVELAMER CARBONATE 4000 MG: 800 TABLET, FILM COATED ORAL at 07:41

## 2024-06-09 RX ADMIN — CALCITRIOL 1.5 MCG: 0.5 CAPSULE ORAL at 07:39

## 2024-06-09 RX ADMIN — CALCIUM CARBONATE (ANTACID) CHEW TAB 500 MG 2000 MG: 500 CHEW TAB at 07:39

## 2024-06-09 RX ADMIN — ACETAMINOPHEN 650 MG: 325 TABLET, FILM COATED ORAL at 09:43

## 2024-06-09 RX ADMIN — AMLODIPINE BESYLATE 10 MG: 10 TABLET ORAL at 07:40

## 2024-06-09 RX ADMIN — CARVEDILOL 12.5 MG: 12.5 TABLET, FILM COATED ORAL at 07:40

## 2024-06-09 ASSESSMENT — ACTIVITIES OF DAILY LIVING (ADL)
ADLS_ACUITY_SCORE: 28

## 2024-06-09 NOTE — PROGRESS NOTES
Care Management Discharge Note    Discharge Date: 06/09/2024       Discharge Disposition: Home    Discharge Services: Outpt infusion    Discharge DME: None    Discharge Transportation: family or friend will provide    Private pay costs discussed: Not applicable    Does the patient's insurance plan have a 3 day qualifying hospital stay waiver?  No    PAS Confirmation Code:  N/A  Patient/family educated on Medicare website which has current facility and service quality ratings:  N/A    Education Provided on the Discharge Plan: Yes  Persons Notified of Discharge Plans: Pt, provider  Patient/Family in Agreement with the Plan: yes    Handoff Referral Completed: Yes    Additional Information:  Pt medically ready for discharge. Writer met with pt and discussed upcoming app[place] [facility] [place] and [place]'s for labs to check calcium with IV infusion of calcium if needed.  Pt was aware and will arrange [facility] for appts. Pt had no further questions or concerns for discharge.     Zabrina Massey RNCC  Covering for 7B  Phone [phone]      RN Care Coordinator     Social Work and Care Management Department      SEARCHABLE in [facility] - search CARE COORDINATOR       [place] [facility] [place] ([phone]) Saturday & Sunday; ([phone]) [facility] Recognized Holidays     Units: 5A Onc Vocera & 5C Vocera Pager: [phone]    Units: 6B Vocera & 6C Vocera  Pager: [phone]    Units: 7A SOT RNCC Vocera, 7B Med Surg Vocera, & 7C Med Surg Vocera  Pager: [phone]    Units: 6A Vocera & 4A CVICU Vocera, 4C MICU Vocera, and 4E SICU Vocera   Pager: [phone]    Units: 5 Ortho Vocera & 5 Med Surg Vocera  Pager: [phone]    Units: 6 Med Surg Vocera & 8 Med Surg Vocera  Pager [phone]

## 2024-06-09 NOTE — PLAN OF CARE
"Patient discharging. Discharge paperwork was reviewed with patient. Patient expressed understanding. A copy was given to patient. Pt discharging home with infusion center setup. Family will be transport. Discharge medications available in pharmacy; pt was advised to pickup medications before they leave. All patient belongings were taken with patient. Patient taken to main lobby/pharmacy by hospital transport.     BP (!) 148/58   Pulse 70   Temp 98.2  F (36.8  C) (Oral)   Resp 16   Ht 1.626 m (5' 4\")   Wt 66.9 kg (147 lb 7.8 oz)   SpO2 97%   BMI 25.32 kg/m        Problem: Adult Inpatient Plan of Care  Goal: Plan of Care Review  Description: The Plan of Care Review/Shift note should be completed every shift.  The Outcome Evaluation is a brief statement about your assessment that the patient is improving, declining, or no change.  This information will be displayed automatically on your shift  note.  Outcome: Adequate for Care Transition  Goal: Patient-Specific Goal (Individualized)  Description: You can add care plan individualizations to a care plan. Examples of Individualization might be:  \"Parent requests to be called daily at 9am for status\", \"I have a hard time hearing out of my right ear\", or \"Do not touch me to wake me up as it startles  me\".  Outcome: Adequate for Care Transition  Goal: Absence of Hospital-Acquired Illness or Injury  Outcome: Adequate for Care Transition  Goal: Optimal Comfort and Wellbeing  Outcome: Adequate for Care Transition  Goal: Readiness for Transition of Care  Outcome: Adequate for Care Transition     Problem: Surgery Nonspecified  Goal: Absence of Bleeding  Outcome: Adequate for Care Transition  Goal: Effective Bowel Elimination  Outcome: Adequate for Care Transition  Goal: Fluid and Electrolyte Balance  Outcome: Adequate for Care Transition  Goal: Blood Glucose Level Within Targeted Range  Outcome: Adequate for Care Transition  Goal: Absence of Infection Signs and " Symptoms  Outcome: Adequate for Care Transition  Goal: Anesthesia/Sedation Recovery  Outcome: Adequate for Care Transition  Goal: Optimal Pain Control and Function  Outcome: Adequate for Care Transition  Goal: Nausea and Vomiting Relief  Outcome: Adequate for Care Transition  Goal: Effective Oxygenation and Ventilation  Outcome: Adequate for Care Transition

## 2024-06-09 NOTE — DISCHARGE SUMMARY
"[facility]  Hospitalist Discharge Summary      Date of Admission:  6/4/2024  Date of Discharge:  6/9/2024 10:09 AM  Discharging Provider: Kathrine Ross MD  Discharge Service: Medicine Service, GASPER TEAM 5    Discharge Diagnoses   Hungry Bone syndrome 2/2 parathyroidectomy    Clinically Significant Risk Factors     # Overweight: Estimated body mass index is 25.32 kg/m  as calculated from the following:    Height as of this encounter: 1.626 m (5' 4\").    Weight as of this encounter: 66.9 kg (147 lb 7.8 oz).       Follow-ups Needed After Discharge   Follow-up Appointments     Adult [facility] Follow-up and recommended labs and tests      Follow up 6/20/24 with Dr. Santana.     Appointments on [place] and/or [facility] (with [facility]   provider or service). Call [phone] if you haven't heard regarding   these appointments within 7 days of discharge.        Adult [facility] Follow-up and recommended labs and tests      Follow up with primary care provider, Brigitte Browne, within 7 days to   evaluate medication change, for hospital follow- up, and regarding new   diagnosis.  The following labs/tests are recommended: BMP, phos, mag.      Appointments on [place] and/or [facility] (with [facility]   provider or service). Call [phone] if you haven't heard regarding   these appointments within 7 days of discharge.            Unresulted Labs Ordered in the Past 30 Days of this Admission       Date and Time Order Name Status Description    6/9/2024  1:00 AM Magnesium In process     6/9/2024  1:00 AM Phosphorus In process     6/9/2024  1:00 AM Basic metabolic panel In process     6/9/2024 12:00 AM Parathyroid Hormone Intact In process     6/4/2024  1:42 PM Surgical Pathology Exam Preliminary         These results will be followed up by PCP    Discharge Disposition   Discharged to home  Condition at discharge: Stable    Hospital Course   Radha MCCARTY" Links is a 68 year old female with a history of hypertension, hyperlipidemia, CAD s/p CABG (2022), type II diabetes, ESRD on HD and secondary hyperparathyroidism with lytic bone lesions, subsequently found to have 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 Santana. Post-op was found to have decreased calcium and elevated potassium concerning for bone hunger syndrome. Medicine team was consulted and subsequently took over as primary team.     # ESRD on HD  # Secondary hyperparathyroidism s/p parathyroidectomy 6/5  # Brown's tumor  # Hungry Bone Syndrome  # Hx of hyperkalemia  Underwent parathyroidectomy 6/5 and had intra-operative drop of PTH from 1190 to 145. Subsequently noted to have low calcium and elevated potassium concerning for bone hunger syndrome. Patient was started on calcitriol and calcium carbonate. Nephrology consulted for assistance in management. On POD 1 had K of 6.2 and Ca of 7.2, received calcium gluconate, dextrose, and insulin. Patient also has history of hyperkalemia and was on lokelma PTA. Was treated with IV calcium and monitored while inpatient. Plan to follow up with infusion center on M and W (6/10 and 6/12) to have calcium checked and with IV calcium gluconate administered if Ca <8 or ical <4. Can discontinue the infusion visits when not needing calcium infusion at two consecutive visits. Will continue with HD.   - Continue calcitriol to 1.5 mg daily  - Continue calcium carbonate to 2000 mg QID   - Sevelamer carbonate 4000 mg TID       # Type II Diabetes  Hgb A1c 5.1. Not currently on any medications PTA.      # Hypertension  PTA amlodipine     # Hyperlipidemia   PTA ezetimibe     # CAD s/p CABG 2022  # Combined systolic and diastolic congestive heart failure  Most recent echo 11/29/22 with EF 55-60%. PTA medications include aspirin, carvedilol, amlodipine, nitroglycerin prn, and rosuvastatin.   - Held aspirin in the setting of  recent parathyroidectomy, can restart outpatient     # Generalized anxiety disorder  Continue PTA duloxetine, hydroxyzine.   - Reports anxiety symptoms well controlled       Consultations This Hospital Stay   NEPHROLOGY ESRD ADULT IP CONSULT  NURSING TO CONSULT FOR VASCULAR ACCESS CARE IP CONSULT  INTERNAL MEDICINE ADULT IP CONSULT FOR [place]  CARE MANAGEMENT / SOCIAL WORK IP CONSULT  NURSING TO CONSULT FOR VASCULAR ACCESS CARE IP CONSULT    Code Status   Full Code    Time Spent on this Encounter   I, Kathrine Ross MD, personally saw the patient today and spent greater than 30 minutes discharging this patient.       Kathrine Ross MD  [facility] [address] [place]  [address] [place] [address]  Phone: [phone]  ______________________________________________________________________    Physical Exam   Vital Signs: Temp: 98.2  F (36.8  C) Temp src: Oral BP: (!) 151/72 Pulse: 70   Resp: 16 SpO2: 97 % O2 Device: None (Room air)    Weight: 147 lbs 7.8 oz    General Appearance:  Awake and alert, no acute distress.   Respiratory: Breathing comfortably on room air. Lungs clear to auscultation bilaterally. Good air movement.   Cardiovascular: Regular rate and rhythm. Systolic murmur.   Resp: non labored  Skin: Surgical incision over lower neck with minimal surrounding erythema, surrounding skin dry,       Primary Care Physician   Brigitte Browne    Discharge Orders      Basic metabolic panel     Adult [facility] Follow-up and recommended labs and tests    Follow up 6/20/24 with Dr. Santana.     Appointments on [place] and/or [facility] (with [facility] or [facility] provider or service). Call [phone] if you haven't heard regarding these appointments within 7 days of discharge.     Adult [facility] Follow-up and recommended labs and tests    Follow up with primary care provider, Brigitte Browne, within 7 days to evaluate medication change, for hospital follow- up, and regarding new diagnosis.  The  following labs/tests are recommended: BMP, phos, mag.      Appointments on [place] and/or [facility] (with [facility] or [facility] provider or service). Call [phone] if you haven't heard regarding these appointments within 7 days of discharge.     Activity    Your activity upon discharge: activity as tolerated     Reason for your hospital stay    Dear Radha Winchester    You were hospitalized at [facility] with low calcium and other electrolyte abnormalities after your parathyroidectomy - this is called hungry bone syndrome. Your treatment consisted of calcium via the IV and replacement of other electrolytes. You were also able to maintain your dialysis schedule while there.  Over your hospitalization your calcium improved and today you are ready to be discharged home with plan to follow up with infusion center.  If you continue to monitor your calcium you should continue to improve but if you develop fever, shortness of breath, light headedness, chest pain or worsening bone pain or fatigue please seek medical attention.    It was a pleasure meeting with you today. Thank you for allowing me and my team the privilege of caring for you today. You are the reason we are here, and I truly hope we provided you with the excellent service you deserve. Please let us know if there is anything else we can do for you so that we can be sure you are leaving completely satisfied with your care experience.      Take care!    Kathrine Ross MD  Internal Medicine Hospitalist  [facility]     Discharge Instructions    - Keep appointments with infusion clinic to monitor your calcium. Okay to stop and cancel remaining when not needing calcium for 2 consecutive appointments    -Follow up appointment with Dr Santana 6/20/24.  - Incision site(s): Keep dressing/incision clean/dry/intact for 24 hours. 24 hours after your operation, you may shower. You have surgical glue over your incision, this will  wear off in time. No submersion in water (lake/pool/tub) for one month or until approved by your surgeon. There are two small tails of suture on either end of your incision; this will be trimmed at your follow-up appointment if still present.   - If you develop any fever/chills, difficulty breathing, worsening pain, redness, swelling, nausea or vomiting, tingling of your hands or around your lips, or drainage from your wound please call Dr Santana at [phone]. If unable to reach her, call [phone] and ask to page the Surgical Oncology resident on call.   Ok to take acetaminophen.     Diet    Follow this diet upon discharge: Orders Placed This Encounter      Regular Diet Adult       Significant Results and Procedures   See in epic    Discharge Medications   Current Discharge Medication List        START taking these medications    Details   acetaminophen (TYLENOL) 325 MG tablet Take 2 tablets (650 mg) by mouth every 4 hours as needed for other (For optimal non-opioid multimodal pain management to improve pain control.)  Qty: 90 tablet, Refills: 0    Associated Diagnoses: Hungry bone syndrome      calcitRIOL (ROCALTROL) 0.5 MCG capsule Take 3 capsules (1.5 mcg) by mouth daily  Qty: 30 capsule, Refills: 0    Associated Diagnoses: Hungry bone syndrome      calcium carbonate (TUMS) 500 MG chewable tablet Take 2 tablets (1,000 mg) by mouth 4 times daily  Qty: 240 tablet, Refills: 0    Associated Diagnoses: Hungry bone syndrome           CONTINUE these medications which have NOT CHANGED    Details   amLODIPine (NORVASC) 10 MG tablet Take 10 mg by mouth daily      aspirin 81 MG EC tablet Take 81 mg by mouth daily      B Complex-C-Folic Acid (VINAYAK-ASTON RX) 1 mg TABS Take 1 mg by mouth daily      carvedilol (COREG) 12.5 MG tablet Take 12.5 mg by mouth 2 times daily (with meals)      clobetasol propionate (TEMOVATE) 0.05 % external cream Apply topically daily as needed (rash)      DULoxetine (CYMBALTA) 20 MG capsule  "Take 20 mg by mouth every evening      enalapril (VASOTEC) 10 MG tablet Enalapril Oral    bid    active      ezetimibe (ZETIA) 10 MG tablet Take 1 tablet by mouth every evening      fluticasone (FLONASE) 50 MCG/ACT nasal spray Spray 2 sprays in nostril as needed      HYDROcodone-acetaminophen (NORCO) 5-325 MG tablet Take 1 tablet by mouth 2 times daily as needed for pain      hydrOXYzine HCl (ATARAX) 10 MG tablet Take 10-20 mg by mouth every 8 hours as needed for anxiety      nitroGLYcerin (NITROSTAT) 0.4 MG sublingual tablet ONE TABLET UNDER TONGUE AS NEEDED FOR CHEST PAIN MAY REPEAT EVERY 5 MINUTES      ondansetron (ZOFRAN) 4 MG tablet TAKE 1 TABLET(4 MG) BY MOUTH EVERY 8 HOURS AS NEEDED FOR NAUSEA OR VOMITING      rosuvastatin (CRESTOR) 10 MG tablet Take 1 tablet by mouth daily      sevelamer carbonate (RENVELA) 800 MG tablet Sevelamer Oral     take 5 tablets with meals and 3 tablets with snacks   active      sodium zirconium cyclosilicate (LOKELMA) 5 g PACK packet Take 5 g by mouth daily      zolpidem (AMBIEN) 10 MG tablet Take 10 mg by mouth nightly as needed for sleep           Allergies   Allergies   Allergen Reactions    Lidocaine Dermatitis and Rash     Cream only; Applied topically to fistula; \"it was eating at my arm\"    Clopidogrel Other (See Comments)     NON-responder    Levofloxacin Diarrhea and Unknown    Metformin Diarrhea    Lorazepam Other (See Comments)    Povidone Iodine Itching and Rash     "

## 2024-06-09 NOTE — PLAN OF CARE
Goal Outcome Evaluation:      Plan of Care Reviewed With: patient    Overall Patient Progress: no change    Neuro: A/OX4  Respiratory: WDL no SOB  Cardiac:WDL. Denies chest pain  Diet:reg  GI/:anuria. Passing gas. No bm this shift   Incision/Drains: neck incision w skin glue-CDI   IV Access:R PIV SL  Pain: managed with PRN [place]  Labs: reviewed  VS:Temp: 98.2  F (36.8  C) Temp src: Oral BP: (!) 151/72 Pulse: 70   Resp: 18 SpO2: 98 % O2 Device: None (Room air)     Activity:ind      New Changes this shift:none   Plan: pending discharge today

## 2024-06-10 ENCOUNTER — APPOINTMENT (OUTPATIENT)
Dept: LAB | Facility: CLINIC | Age: 69
End: 2024-06-10
Attending: STUDENT IN AN ORGANIZED HEALTH CARE EDUCATION/TRAINING PROGRAM
Payer: MEDICARE

## 2024-06-10 ENCOUNTER — INFUSION THERAPY VISIT (OUTPATIENT)
Dept: INFUSION THERAPY | Facility: CLINIC | Age: 69
End: 2024-06-10
Attending: STUDENT IN AN ORGANIZED HEALTH CARE EDUCATION/TRAINING PROGRAM
Payer: MEDICARE

## 2024-06-10 VITALS
TEMPERATURE: 97.5 F | BODY MASS INDEX: 26 KG/M2 | OXYGEN SATURATION: 96 % | SYSTOLIC BLOOD PRESSURE: 143 MMHG | WEIGHT: 151.5 LBS | HEART RATE: 71 BPM | DIASTOLIC BLOOD PRESSURE: 61 MMHG | RESPIRATION RATE: 16 BRPM

## 2024-06-10 DIAGNOSIS — E83.81 HUNGRY BONE SYNDROME: Primary | ICD-10-CM

## 2024-06-10 DIAGNOSIS — E21.3 HYPERPARATHYROIDISM (H): ICD-10-CM

## 2024-06-10 LAB
CA-I BLD-MCNC: 4.3 MG/DL (ref 4.4–5.2)
CALCIUM SERPL-MCNC: 8.7 MG/DL (ref 8.8–10.2)

## 2024-06-10 PROCEDURE — 82310 ASSAY OF CALCIUM: CPT

## 2024-06-10 PROCEDURE — 36415 COLL VENOUS BLD VENIPUNCTURE: CPT

## 2024-06-10 PROCEDURE — 82330 ASSAY OF CALCIUM: CPT

## 2024-06-10 RX ORDER — EPINEPHRINE 1 MG/ML
0.3 INJECTION, SOLUTION INTRAMUSCULAR; SUBCUTANEOUS EVERY 5 MIN PRN
Status: CANCELLED | OUTPATIENT
Start: 2024-06-14

## 2024-06-10 RX ORDER — HEPARIN SODIUM (PORCINE) LOCK FLUSH IV SOLN 100 UNIT/ML 100 UNIT/ML
5 SOLUTION INTRAVENOUS
Status: CANCELLED | OUTPATIENT
Start: 2024-06-14

## 2024-06-10 RX ORDER — MEPERIDINE HYDROCHLORIDE 25 MG/ML
25 INJECTION INTRAMUSCULAR; INTRAVENOUS; SUBCUTANEOUS EVERY 30 MIN PRN
Status: CANCELLED | OUTPATIENT
Start: 2024-06-14

## 2024-06-10 RX ORDER — METHYLPREDNISOLONE SODIUM SUCCINATE 125 MG/2ML
125 INJECTION, POWDER, LYOPHILIZED, FOR SOLUTION INTRAMUSCULAR; INTRAVENOUS
Status: CANCELLED
Start: 2024-06-14

## 2024-06-10 RX ORDER — ALBUTEROL SULFATE 90 UG/1
1-2 AEROSOL, METERED RESPIRATORY (INHALATION)
Status: CANCELLED
Start: 2024-06-14

## 2024-06-10 RX ORDER — DIPHENHYDRAMINE HYDROCHLORIDE 50 MG/ML
50 INJECTION INTRAMUSCULAR; INTRAVENOUS
Status: CANCELLED
Start: 2024-06-14

## 2024-06-10 RX ORDER — HEPARIN SODIUM,PORCINE 10 UNIT/ML
5-20 VIAL (ML) INTRAVENOUS DAILY PRN
Status: CANCELLED | OUTPATIENT
Start: 2024-06-14

## 2024-06-10 RX ORDER — ALBUTEROL SULFATE 0.83 MG/ML
2.5 SOLUTION RESPIRATORY (INHALATION)
Status: CANCELLED | OUTPATIENT
Start: 2024-06-14

## 2024-06-10 ASSESSMENT — PAIN SCALES - GENERAL: PAINLEVEL: MILD PAIN (2)

## 2024-06-10 NOTE — PROGRESS NOTES
Infusion Nursing Note:  Radha LUL Winchester presents today for Calcium infusion.    Patient seen by provider today: No   present during visit today: Not Applicable.    Note: Calcium was not administered today.    Intravenous Access:  Peripheral IV placed placed in lab.    Treatment Conditions:  Results reviewed, labs did NOT meet treatment parameters:   Twice weekly calcium checks with infusion of 2 gram calcium gluconate if calcium is below 8 or ionized calcium is below 4   Calcium   Date Value Ref Range Status   06/10/2024 8.7 (L) 8.8 - 10.2 mg/dL Final     Calcium Ionized Whole Blood   Date Value Ref Range Status   06/10/2024 4.3 (L) 4.4 - 5.2 mg/dL Final     Post Infusion Assessment:  Site patent and intact, free from redness, edema or discomfort.  No evidence of extravasations.  Access discontinued per protocol.     Discharge Plan:   AVS to patient via MYCHART.  Patient will return 6/12/24 for next appointment.   Patient discharged in stable condition accompanied by: self.  Departure Mode: Ambulatory.    Vannessa Nielson RN

## 2024-06-10 NOTE — PATIENT INSTRUCTIONS
Dear Radha Winchester    Thank you for choosing [facility] ([facility]) for your infusion.  The following information is a summary of our appointment as well as important reminders.      We look forward in seeing you on your next appointment here at [facility] ([facility]).  Please don t hesitate to call us at [phone] to reschedule any of your appointments or to speak with one of the [facility] registered nurses.  It was a pleasure taking care of you today.    Sincerely,    [facility]  [facility]  [address] [place] [address]  [address]  Phone:  [phone]

## 2024-06-10 NOTE — NURSING NOTE
Chief Complaint   Patient presents with    Blood Draw     Labs drawn via PIV placed by VAT. VS taken.     Labs drawn from PIV placed by VAT. Line flushed with saline. Vitals taken. Pt checked in for appointment(s).     Nichol Mendenhall RN

## 2024-06-11 LAB
PATH REPORT.COMMENTS IMP SPEC: NORMAL
PATH REPORT.COMMENTS IMP SPEC: NORMAL
PATH REPORT.FINAL DX SPEC: NORMAL
PATH REPORT.GROSS SPEC: NORMAL
PATH REPORT.INTRAOP OBS SPEC DOC: NORMAL
PATH REPORT.MICROSCOPIC SPEC OTHER STN: NORMAL
PATH REPORT.RELEVANT HX SPEC: NORMAL
PHOTO IMAGE: NORMAL

## 2024-06-11 RX ORDER — HEPARIN SODIUM (PORCINE) LOCK FLUSH IV SOLN 100 UNIT/ML 100 UNIT/ML
5 SOLUTION INTRAVENOUS
OUTPATIENT
Start: 2024-06-14

## 2024-06-11 RX ORDER — DIPHENHYDRAMINE HYDROCHLORIDE 50 MG/ML
50 INJECTION INTRAMUSCULAR; INTRAVENOUS
Start: 2024-06-14

## 2024-06-11 RX ORDER — METHYLPREDNISOLONE SODIUM SUCCINATE 125 MG/2ML
125 INJECTION, POWDER, LYOPHILIZED, FOR SOLUTION INTRAMUSCULAR; INTRAVENOUS
Start: 2024-06-14

## 2024-06-11 RX ORDER — MEPERIDINE HYDROCHLORIDE 25 MG/ML
25 INJECTION INTRAMUSCULAR; INTRAVENOUS; SUBCUTANEOUS EVERY 30 MIN PRN
OUTPATIENT
Start: 2024-06-14

## 2024-06-11 RX ORDER — ALBUTEROL SULFATE 0.83 MG/ML
2.5 SOLUTION RESPIRATORY (INHALATION)
OUTPATIENT
Start: 2024-06-14

## 2024-06-11 RX ORDER — EPINEPHRINE 1 MG/ML
0.3 INJECTION, SOLUTION INTRAMUSCULAR; SUBCUTANEOUS EVERY 5 MIN PRN
OUTPATIENT
Start: 2024-06-14

## 2024-06-11 RX ORDER — HEPARIN SODIUM,PORCINE 10 UNIT/ML
5-20 VIAL (ML) INTRAVENOUS DAILY PRN
OUTPATIENT
Start: 2024-06-14

## 2024-06-11 RX ORDER — ALBUTEROL SULFATE 90 UG/1
1-2 AEROSOL, METERED RESPIRATORY (INHALATION)
Start: 2024-06-14

## 2024-06-12 ENCOUNTER — INFUSION THERAPY VISIT (OUTPATIENT)
Dept: INFUSION THERAPY | Facility: CLINIC | Age: 69
End: 2024-06-12
Attending: STUDENT IN AN ORGANIZED HEALTH CARE EDUCATION/TRAINING PROGRAM
Payer: MEDICARE

## 2024-06-12 ENCOUNTER — APPOINTMENT (OUTPATIENT)
Dept: LAB | Facility: CLINIC | Age: 69
End: 2024-06-12
Attending: STUDENT IN AN ORGANIZED HEALTH CARE EDUCATION/TRAINING PROGRAM
Payer: MEDICARE

## 2024-06-12 VITALS
TEMPERATURE: 98.4 F | SYSTOLIC BLOOD PRESSURE: 157 MMHG | BODY MASS INDEX: 25.46 KG/M2 | OXYGEN SATURATION: 97 % | RESPIRATION RATE: 18 BRPM | DIASTOLIC BLOOD PRESSURE: 64 MMHG | HEART RATE: 69 BPM | WEIGHT: 148.3 LBS

## 2024-06-12 DIAGNOSIS — E83.81 HUNGRY BONE SYNDROME: Primary | ICD-10-CM

## 2024-06-12 DIAGNOSIS — E21.3 HYPERPARATHYROIDISM (H): ICD-10-CM

## 2024-06-12 LAB
CA-I BLD-MCNC: 4.5 MG/DL (ref 4.4–5.2)
CALCIUM SERPL-MCNC: 8.9 MG/DL (ref 8.8–10.2)

## 2024-06-12 PROCEDURE — 36415 COLL VENOUS BLD VENIPUNCTURE: CPT

## 2024-06-12 PROCEDURE — 82310 ASSAY OF CALCIUM: CPT

## 2024-06-12 PROCEDURE — 82330 ASSAY OF CALCIUM: CPT

## 2024-06-12 ASSESSMENT — PAIN SCALES - GENERAL: PAINLEVEL: NO PAIN (0)

## 2024-06-12 NOTE — NURSING NOTE
Chief Complaint   Patient presents with    Blood Draw     Labs drawn with PIV start by vascular access. Pt tolerated well. Vitals taken. Patient checked into next appointment.       Yudy Guajardo RN

## 2024-06-12 NOTE — PROGRESS NOTES
Nursing Note  Radha Winchester [facility] for:   Chief Complaint   Patient presents with    Blood Draw     Labs drawn with PIV start by vascular access. Pt tolerated well. Vitals taken. Patient checked into next appointment.       During today's [facility] appointment, orders from Dr. Kathrine Ross were completed. Patient did not receive calcium replacement today.  Frequency: twice weekly    Progress note:  Patient identification verified by name and date of birth.  Assessment completed.  Vitals recorded in Doc Flowsheets.      Treatment Conditions:   Calcium   Date Value Ref Range Status   06/12/2024 8.9 8.8 - 10.2 mg/dL Final     Calcium Ionized Whole Blood   Date Value Ref Range Status   06/12/2024 4.5 4.4 - 5.2 mg/dL Final     Is the next appt scheduled? Yes but per parameters a message sent to Dr. Ross as orders state: Can be discontinued after 2 simultaneous checks without infusion needed, writer had not heard back from provider, will follow up.    Leah Oro RN        BP (!) 157/64 (BP Location: Right arm, Patient Position: Sitting, Cuff Size: Adult Regular)   Pulse 69   Temp 98.4  F (36.9  C) (Oral)   Resp 18   Wt 67.3 kg (148 lb 4.8 oz)   SpO2 97%   BMI 25.46 kg/m

## 2024-06-18 ENCOUNTER — TELEPHONE (OUTPATIENT)
Dept: URGENT CARE | Facility: CLINIC | Age: 69
End: 2024-06-18
Payer: MEDICARE

## 2024-06-18 NOTE — TELEPHONE ENCOUNTER
----- Message from Kathrine Ross MD sent at 6/17/2024  5:20 PM CDT -----  Regarding: RE: Orders for Johanny Links  Yes, she can discontinue at this point.  ----- Message -----  From: Leah Oro RN  Sent: 6/12/2024   1:27 PM CDT  To: Kathrine Ross MD  Subject: Orders for Johanny Links                             Good afternoon,    We had Johanny today for possible calcium infusion. She did not meet parameters today Wednesday 6/12/24 and also did not meet parameters on Monday 6/10    Calcium  Date Value Ref Range Status  06/12/2024 8.9 8.8 - 10.2 mg/dL Final    Calcium Ionized Whole Blood  Date Value Ref Range Status  06/12/2024 4.5 4.4 - 5.2 mg/dL Final    On 6/10 they were calcium 8.7 and ionized calcium 4.3    Is it ok to discontinue orders and cancel future appointments?    Thank you,  Salud Oro RN, BSN, CRNI  [facility]  Phone [phone]

## 2024-06-20 ENCOUNTER — VIRTUAL VISIT (OUTPATIENT)
Dept: SURGERY | Facility: CLINIC | Age: 69
End: 2024-06-20
Payer: MEDICARE

## 2024-06-20 DIAGNOSIS — Z98.890 S/P SUBTOTAL PARATHYROIDECTOMY: Primary | ICD-10-CM

## 2024-06-20 DIAGNOSIS — Z90.89 S/P SUBTOTAL PARATHYROIDECTOMY: Primary | ICD-10-CM

## 2024-06-20 PROCEDURE — 99024 POSTOP FOLLOW-UP VISIT: CPT | Mod: 95 | Performed by: SURGERY

## 2024-06-20 NOTE — LETTER
6/20/2024      Radha Winchester  [address] [place] [address]  [place] [address]      Dear Colleague,    Thank you for referring your patient, Radha Winchester, to [facility]. Please see a copy of my visit note below.    x    Due to the COVID 19 pandemic this visit was a video visit in order to help prevent spread of infection in this high risk patient and the general population. The patient gave verbal consent for the visit today.    Start time 11:30am  Stop time 11:45am  Total time 15 minutes face to face conversation video   Chart prep , review of images, results , start note prior to appt 10 minutes   Total 25 minutes     This is a 3-week postop visit status post recent 3-1/2 parathyroid glands of the remaining right inferior parathyroid.  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    Since the surgery the patient has been followed closely by her nephrologist who has been checking her calcium and vitamin D at least twice a week.  The nephrologist is adjusting her calcium and her vitamin D.    The patient denies any probs with voice quality/or swallowing.  No symptoms of hypocalcemia.  In fact she states she has better energy and less bone pain.    Physical exam could not be performed but via the camera I was able to see that her Dermabond has come off and there did not appear to be any evidence of hematoma seroma or infection.      Plan  I reviewed with the patient wound manage massage  The patient is following closely with her nephrologist who was adjusting her calcium and vitamin D.  The patient follow-up with me as needed      Again, thank you for allowing me to participate in the care of your patient.        Sincerely,        Anita Santana MD

## 2024-07-16 NOTE — PROGRESS NOTES
Due to the COVID 19 pandemic this visit was a video visit in order to help prevent spread of infection in this high risk patient and the general population. The patient gave verbal consent for the visit today.    Start time 11:30am  Stop time 11:45am  Total time 15 minutes face to face conversation video   Chart prep , review of images, results , start note prior to appt 10 minutes   Total 25 minutes     This is a 3-week postop visit status post recent 3-1/2 parathyroid glands of the remaining right inferior parathyroid.  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    Since the surgery the patient has been followed closely by her nephrologist who has been checking her calcium and vitamin D at least twice a week.  The nephrologist is adjusting her calcium and her vitamin D.    The patient denies any probs with voice quality/or swallowing.  No symptoms of hypocalcemia.  In fact she states she has better energy and less bone pain.    Physical exam could not be performed but via the camera I was able to see that her Dermabond has come off and there did not appear to be any evidence of hematoma seroma or infection.      Plan  I reviewed with the patient wound manage massage  The patient is following closely with her nephrologist who was adjusting her calcium and vitamin D.  The patient follow-up with me as needed

## 2025-06-29 ENCOUNTER — HEALTH MAINTENANCE LETTER (OUTPATIENT)
Age: 70
End: 2025-06-29

## (undated) DEVICE — SU SILK 4-0 TIE 12X30" A303H

## (undated) DEVICE — CLIP HORIZON MED BLUE 002200

## (undated) DEVICE — PACK NEURO MINOR UMMC SNE32MNMU4

## (undated) DEVICE — TUBE ENDOTRACHEAL NIM TRIVANTAGE 6.0MM 8229706

## (undated) DEVICE — SU CHROMIC 3-0 SH 27" G122H

## (undated) DEVICE — SU SILK 3-0 TIE 12X30" A304H

## (undated) DEVICE — ESU GROUND PAD ADULT W/CORD E7507

## (undated) DEVICE — SU SILK 2-0 TIE 12X30" A305H

## (undated) DEVICE — SU MONOCRYL 5-0 P-3 18" UND Y493G

## (undated) DEVICE — LINEN TOWEL PACK X6 WHITE 5487

## (undated) DEVICE — SOL NACL 0.9% IRRIG 1000ML BOTTLE 2F7124

## (undated) DEVICE — CLIP HORIZON SM RED WIDE SLOT 001201

## (undated) DEVICE — SPONGE KITTNER 30-101

## (undated) DEVICE — PREP CHLORAPREP 26ML TINTED HI-LITE ORANGE 930815

## (undated) DEVICE — SUCTION MANIFOLD NEPTUNE 2 SYS 4 PORT 0702-020-000

## (undated) DEVICE — NIM PROBE NS STD INCR PRASS TIP STRL LF DISP 8225825X

## (undated) DEVICE — SURGICEL FIBRILLAR HEMOSTAT 4"X4" 1963

## (undated) DEVICE — LINEN TOWEL PACK X5 5464

## (undated) RX ORDER — FENTANYL CITRATE 50 UG/ML
INJECTION, SOLUTION INTRAMUSCULAR; INTRAVENOUS
Status: DISPENSED
Start: 2024-06-04

## (undated) RX ORDER — PROPOFOL 10 MG/ML
INJECTION, EMULSION INTRAVENOUS
Status: DISPENSED
Start: 2024-06-04

## (undated) RX ORDER — OXYCODONE HYDROCHLORIDE 5 MG/1
TABLET ORAL
Status: DISPENSED
Start: 2024-06-04

## (undated) RX ORDER — HYDROMORPHONE HYDROCHLORIDE 1 MG/ML
INJECTION, SOLUTION INTRAMUSCULAR; INTRAVENOUS; SUBCUTANEOUS
Status: DISPENSED
Start: 2024-06-04

## (undated) RX ORDER — ACETAMINOPHEN 325 MG/1
TABLET ORAL
Status: DISPENSED
Start: 2024-06-04